# Patient Record
Sex: MALE | Race: BLACK OR AFRICAN AMERICAN | Employment: OTHER | URBAN - METROPOLITAN AREA
[De-identification: names, ages, dates, MRNs, and addresses within clinical notes are randomized per-mention and may not be internally consistent; named-entity substitution may affect disease eponyms.]

---

## 2021-11-22 ENCOUNTER — HOSPITAL ENCOUNTER (OUTPATIENT)
Dept: GENERAL RADIOLOGY | Age: 71
Discharge: HOME | End: 2021-11-22
Attending: INTERNAL MEDICINE
Payer: MEDICARE

## 2021-11-22 ENCOUNTER — OFFICE VISIT (OUTPATIENT)
Dept: PRIMARY CARE CLINIC | Age: 71
End: 2021-11-22
Payer: MEDICARE

## 2021-11-22 VITALS
OXYGEN SATURATION: 99 % | DIASTOLIC BLOOD PRESSURE: 64 MMHG | SYSTOLIC BLOOD PRESSURE: 115 MMHG | TEMPERATURE: 98.6 F | HEIGHT: 71 IN | RESPIRATION RATE: 18 BRPM | HEART RATE: 85 BPM | BODY MASS INDEX: 22.85 KG/M2 | WEIGHT: 163.2 LBS

## 2021-11-22 DIAGNOSIS — G30.9 ALZHEIMER DISEASE (HCC): ICD-10-CM

## 2021-11-22 DIAGNOSIS — M54.50 CHRONIC MIDLINE LOW BACK PAIN, UNSPECIFIED WHETHER SCIATICA PRESENT: ICD-10-CM

## 2021-11-22 DIAGNOSIS — M21.861 HYPEREXTENSION DEFORMITY OF KNEE, RIGHT: ICD-10-CM

## 2021-11-22 DIAGNOSIS — E78.00 HYPERCHOLESTEREMIA: ICD-10-CM

## 2021-11-22 DIAGNOSIS — G89.29 CHRONIC MIDLINE LOW BACK PAIN, UNSPECIFIED WHETHER SCIATICA PRESENT: ICD-10-CM

## 2021-11-22 DIAGNOSIS — F02.80 ALZHEIMER DISEASE (HCC): ICD-10-CM

## 2021-11-22 DIAGNOSIS — R20.0 NUMBNESS AND TINGLING OF BOTH LEGS: ICD-10-CM

## 2021-11-22 DIAGNOSIS — I25.10 CORONARY ARTERY DISEASE INVOLVING NATIVE CORONARY ARTERY OF NATIVE HEART WITHOUT ANGINA PECTORIS: ICD-10-CM

## 2021-11-22 DIAGNOSIS — R20.2 NUMBNESS AND TINGLING OF BOTH LEGS: ICD-10-CM

## 2021-11-22 DIAGNOSIS — F41.9 ANXIETY: ICD-10-CM

## 2021-11-22 DIAGNOSIS — E11.9 DM TYPE 2, GOAL HBA1C 7%-8% (HCC): Primary | ICD-10-CM

## 2021-11-22 DIAGNOSIS — I10 PRIMARY HYPERTENSION: ICD-10-CM

## 2021-11-22 DIAGNOSIS — N40.1 BENIGN PROSTATIC HYPERPLASIA WITH LOWER URINARY TRACT SYMPTOMS, SYMPTOM DETAILS UNSPECIFIED: ICD-10-CM

## 2021-11-22 DIAGNOSIS — N39.0 URINARY TRACT INFECTION WITHOUT HEMATURIA, SITE UNSPECIFIED: ICD-10-CM

## 2021-11-22 PROCEDURE — G8432 DEP SCR NOT DOC, RNG: HCPCS | Performed by: INTERNAL MEDICINE

## 2021-11-22 PROCEDURE — 99204 OFFICE O/P NEW MOD 45 MIN: CPT | Performed by: INTERNAL MEDICINE

## 2021-11-22 PROCEDURE — G8420 CALC BMI NORM PARAMETERS: HCPCS | Performed by: INTERNAL MEDICINE

## 2021-11-22 PROCEDURE — 72100 X-RAY EXAM L-S SPINE 2/3 VWS: CPT

## 2021-11-22 PROCEDURE — G8536 NO DOC ELDER MAL SCRN: HCPCS | Performed by: INTERNAL MEDICINE

## 2021-11-22 PROCEDURE — 2022F DILAT RTA XM EVC RTNOPTHY: CPT | Performed by: INTERNAL MEDICINE

## 2021-11-22 PROCEDURE — G8427 DOCREV CUR MEDS BY ELIG CLIN: HCPCS | Performed by: INTERNAL MEDICINE

## 2021-11-22 PROCEDURE — 3046F HEMOGLOBIN A1C LEVEL >9.0%: CPT | Performed by: INTERNAL MEDICINE

## 2021-11-22 PROCEDURE — 1101F PT FALLS ASSESS-DOCD LE1/YR: CPT | Performed by: INTERNAL MEDICINE

## 2021-11-22 PROCEDURE — 3017F COLORECTAL CA SCREEN DOC REV: CPT | Performed by: INTERNAL MEDICINE

## 2021-11-22 RX ORDER — MEMANTINE HYDROCHLORIDE 28 MG/1
28 CAPSULE, EXTENDED RELEASE ORAL DAILY
COMMUNITY
Start: 2021-09-24 | End: 2022-11-02 | Stop reason: SDUPTHER

## 2021-11-22 RX ORDER — METFORMIN HYDROCHLORIDE 500 MG/1
500 TABLET, EXTENDED RELEASE ORAL DAILY
COMMUNITY
Start: 2021-10-13

## 2021-11-22 RX ORDER — LORAZEPAM 0.5 MG/1
0.5 TABLET ORAL
COMMUNITY
Start: 2021-10-12 | End: 2022-11-02 | Stop reason: SDUPTHER

## 2021-11-22 RX ORDER — SERTRALINE HYDROCHLORIDE 100 MG/1
100 TABLET, FILM COATED ORAL DAILY
COMMUNITY
Start: 2021-11-09

## 2021-11-22 RX ORDER — LANOLIN ALCOHOL/MO/W.PET/CERES
1000 CREAM (GRAM) TOPICAL DAILY
COMMUNITY

## 2021-11-22 RX ORDER — TAMSULOSIN HYDROCHLORIDE 0.4 MG/1
0.4 CAPSULE ORAL DAILY
COMMUNITY
Start: 2021-09-03

## 2021-11-22 RX ORDER — ATORVASTATIN CALCIUM 40 MG/1
40 TABLET, FILM COATED ORAL
COMMUNITY
Start: 2021-09-10

## 2021-11-22 RX ORDER — HYDRALAZINE HYDROCHLORIDE 25 MG/1
TABLET, FILM COATED ORAL
COMMUNITY
Start: 2021-11-07 | End: 2021-12-22 | Stop reason: DRUGHIGH

## 2021-11-22 RX ORDER — CLOPIDOGREL BISULFATE 75 MG/1
75 TABLET ORAL DAILY
COMMUNITY
Start: 2021-10-18 | End: 2021-12-29 | Stop reason: SDUPTHER

## 2021-11-22 RX ORDER — MELATONIN 5 MG
CAPSULE ORAL
COMMUNITY
End: 2022-10-12

## 2021-11-22 RX ORDER — BISMUTH SUBSALICYLATE 262 MG
1 TABLET,CHEWABLE ORAL DAILY
COMMUNITY

## 2021-11-22 RX ORDER — CARVEDILOL 6.25 MG/1
6.25 TABLET ORAL 2 TIMES DAILY
COMMUNITY
Start: 2021-10-25 | End: 2022-01-27 | Stop reason: SDUPTHER

## 2021-11-22 NOTE — PROGRESS NOTES
Matthieu Rossi is a 73 y.o.  male and presents with     Chief Complaint   Patient presents with    New Patient    Establish Care     Pt s here to establish care. Pt moved to be closer to his daughter who wants to take care of his health issues. Pts daughter works for [employer]. Pt has h/o DM, HTN,hyperchol, CAD, s/p angioplasty, ALzheimers, anxiety, BPH. Pt used to see Cardioogy, Neurology , Endocrinology,   Was going to [facility] in [place]. Pt is enrolled in a trial drug and gets insuions every 4 weeks in [place], [place]. Pt has numbness in toes. Pt was getting PT  For weakness on rt side. RT knee hyperextends. Pt uses a cane to walk. He has hyperextension of his rt knee. Pt had a sever UTI before he moved but could not followup with Urology. History reviewed. No pertinent past medical history. History reviewed. No pertinent surgical history. Current Outpatient Medications   Medication Sig    atorvastatin (LIPITOR) 40 mg tablet     carvediloL (COREG) 6.25 mg tablet     clopidogreL (PLAVIX) 75 mg tab     hydrALAZINE (APRESOLINE) 25 mg tablet     LORazepam (ATIVAN) 0.5 mg tablet     Namenda XR 28 mg capsule Take 28 mg by mouth daily.  metFORMIN ER (GLUCOPHAGE XR) 500 mg tablet     sertraline (ZOLOFT) 100 mg tablet Take 100 mg by mouth daily.  tamsulosin (FLOMAX) 0.4 mg capsule Take 0.4 mg by mouth daily.  aspirin 81 mg cap Take  by mouth.  melatonin 5 mg cap capsule Take  by mouth nightly.  multivitamin (ONE A DAY) tablet Take 1 Tablet by mouth daily.  cyanocobalamin (Vitamin B-12) 1,000 mcg tablet Take 1,000 mcg by mouth daily. No current facility-administered medications for this visit.      Health Maintenance   Topic Date Due    Hepatitis C Screening  Never done    Lipid Screen  Never done    DTaP/Tdap/Td series (1 - Tdap) Never done    Colorectal Cancer Screening Combo  Never done    Shingrix Vaccine Age 50> (1 of 2) Never done    Pneumococcal 65+ years (1 of 1 - PPSV23) Never done    Flu Vaccine (1) Never done    Medicare Yearly Exam  Never done    COVID-19 Vaccine  Completed     Immunization History   Administered Date(s) Administered    COVID-19, J&J, PF, 0.5 mL Dose 03/12/2021    COVID-19, PFIZER, MRNA, LNP-S, PF, 30MCG/0.3ML DOSE 11/04/2021     No LMP for male patient. Allergies and Intolerances:   No Known Allergies    Family History:   History reviewed. No pertinent family history. Social History:   He  reports that he has quit smoking. He has quit using smokeless tobacco.  He  reports previous alcohol use.             Review of Systems:   General: negative for - chills, fatigue, fever, weight change  Psych: negative for - anxiety, depression, irritability or mood swings  ENT: negative for - headaches, hearing change, nasal congestion, oral lesions, sneezing or sore throat  Heme/ Lymph: negative for - bleeding problems, bruising, pallor or swollen lymph nodes  Endo: negative for - hot flashes, polydipsia/polyuria or temperature intolerance  Resp: negative for - cough, shortness of breath or wheezing  CV: negative for - chest pain, edema or palpitations  GI: negative for - abdominal pain, change in bowel habits, constipation, diarrhea or nausea/vomiting  : negative for - dysuria, hematuria, incontinence, pelvic pain or vulvar/vaginal symptoms  MSK: negative for - joint pain, joint swelling or muscle pain  Neuro: negative for - confusion, headaches, seizures or weakness  Derm: negative for - dry skin, hair changes, rash or skin lesion changes          Physical:   Vitals:   Vitals:    11/22/21 1122   BP: 115/64   Pulse: 85   Resp: 18   Temp: 98.6 °F (37 °C)   TempSrc: Oral   SpO2: 99%   Weight: 163 lb 3.2 oz (74 kg)   Height: 5' 11\" (1.803 m)           Exam:   HEENT- atraumatic,normocephalic, awake, oriented, well nourished  Neck - supple,no enlarged lymph nodes, no JVD, no thyromegaly  Chest- CTA, no rhonchi, no crackles  Heart- rrr, no murmurs / gallop/rub  Abdomen- soft,BS+,NT, no hepatosplenomegaly  Ext - no c/c/edema , hyperextension of rt knee,   Neuro- no focal deficits. Power 5/5 all extremities  Skin - warm,dry, no obvious rashes. Review of Data:   LABS:   No results found for: WBC, HGB, HCT, PLT, HGBEXT, HCTEXT, PLTEXT, HGBEXT, HCTEXT, PLTEXT  No results found for: NA, K, CL, CO2, GLU, BUN, CREA  No results found for: CHOL, CHOLX, CHLST, CHOLV, HDL, HDLP, LDL, LDLC, DLDLP, TGLX, TRIGL, TRIGP  No components found for: GPT        Impression / Plan:        ICD-10-CM ICD-9-CM    1. DM type 2, goal HbA1c 7%-8% ([facility])  E11.9 250.00 CBC WITH AUTOMATED DIFF      METABOLIC PANEL, COMPREHENSIVE      LIPID PANEL      MICROALBUMIN, UR, RAND W/ MICROALB/CREAT RATIO      HEMOGLOBIN A1C WITH EAG      HEMOGLOBIN A1C WITH EAG      MICROALBUMIN, UR, RAND W/ MICROALB/CREAT RATIO      LIPID PANEL      METABOLIC PANEL, COMPREHENSIVE      CBC WITH AUTOMATED DIFF   2. Hypercholesteremia  E78.00 272.0    3. Primary hypertension  I10 401.9    4. Coronary artery disease involving native coronary artery of native heart without angina pectoris  I25.10 414.01 REFERRAL TO CARDIOLOGY   5. Benign prostatic hyperplasia with lower urinary tract symptoms, symptom details unspecified  N40.1 600.01 REFERRAL TO UROLOGY   6. Anxiety  F41.9 300.00    7. Hyperextension deformity of knee, right  M21.861 718.86 REFERRAL TO ORTHOPEDICS   8. Numbness and tingling of both legs  R20.0 782.0 REFERRAL TO NEUROLOGY    R20.2     9. Alzheimer disease ([facility] [address]  G30.9 331.0 REFERRAL TO NEUROLOGY    F02.80     10. Chronic midline low back pain, unspecified whether sciatica present  M54.50 724.2 XR SPINE LUMB 2 OR 3 V    G89.29 338.29    11. Urinary tract infection without hematuria, site unspecified  N39.0 599.0 URINALYSIS W/ RFLX MICROSCOPIC      CULTURE, URINE      CULTURE, URINE      URINALYSIS W/ RFLX MICROSCOPIC         Explained to patient risk benefits of the medications. Advised patient to stop meds if having any side effects. Pt verbalized understanding of the instructions. I have discussed the diagnosis with the patient and the intended plan as seen in the above orders. The patient has received an after-visit summary and questions were answered concerning future plans. I have discussed medication side effects and warnings with the patient as well. I have reviewed the plan of care with the patient, accepted their input and they are in agreement with the treatment goals. Reviewed plan of care. Patient has provided input and agrees with goals. Follow-up and Dispositions    · Return in about 1 month (around 12/22/2021).          King Serna MD

## 2021-11-22 NOTE — PROGRESS NOTES
Identified pt with two pt identifiers(name and ). Reviewed record in preparation for visit and have obtained necessary documentation. Chief Complaint   Patient presents with    New Patient   [facility] Annual Wellness Visit   Wife reported patient having several diagnosis- no notes provided by family today. Patient diagnosed with Alzheimer's- currently in clinical trails. Patient was in PT- would like a referral to continue here in [place] (previously lived in [place])    Health Maintenance Due   Topic    Hepatitis C Screening     Lipid Screen     COVID-19 Vaccine (1)    DTaP/Tdap/Td series (1 - Tdap)    Colorectal Cancer Screening Combo     Shingrix Vaccine Age 50> (1 of 2)    Pneumococcal 65+ years (1 of 1 - PPSV23)    Flu Vaccine (1)    Medicare Yearly Exam         Visit Vitals  /64 (BP 1 Location: Right arm, BP Patient Position: Sitting)   Pulse 85   Temp 98.6 °F (37 °C) (Oral)   Resp 18   Ht 5' 11\" (1.803 m)   Wt 163 lb 3.2 oz (74 kg)   SpO2 99%   BMI 22.76 kg/m²     Pain Scale: /10    Coordination of Care Questionnaire:  :   1. Have you been to the ER, urgent care clinic since your last visit? Hospitalized since your last visit? No    2. Have you seen or consulted any other health care providers outside of the [address] [place] [address] since your last visit? Include any pap smears or colon screening.  No

## 2021-11-24 LAB
ALBUMIN SERPL-MCNC: 4.4 G/DL (ref 3.8–4.8)
ALBUMIN/CREAT UR: 4 MG/G CREAT (ref 0–29)
ALBUMIN/GLOB SERPL: 1.7 {RATIO} (ref 1.2–2.2)
ALP SERPL-CCNC: 91 IU/L (ref 44–121)
ALT SERPL-CCNC: 51 IU/L (ref 0–44)
APPEARANCE UR: CLEAR
AST SERPL-CCNC: 46 IU/L (ref 0–40)
BACTERIA UR CULT: NORMAL
BASOPHILS # BLD AUTO: 0.1 X10E3/UL (ref 0–0.2)
BASOPHILS NFR BLD AUTO: 1 %
BILIRUB SERPL-MCNC: 0.3 MG/DL (ref 0–1.2)
BILIRUB UR QL STRIP: NEGATIVE
BUN SERPL-MCNC: 23 MG/DL (ref 8–27)
BUN/CREAT SERPL: 16 (ref 10–24)
CALCIUM SERPL-MCNC: 9.4 MG/DL (ref 8.6–10.2)
CHLORIDE SERPL-SCNC: 103 MMOL/L (ref 96–106)
CHOLEST SERPL-MCNC: 141 MG/DL (ref 100–199)
CO2 SERPL-SCNC: 23 MMOL/L (ref 20–29)
COLOR UR: YELLOW
CREAT SERPL-MCNC: 1.4 MG/DL (ref 0.76–1.27)
CREAT UR-MCNC: 159.9 MG/DL
EOSINOPHIL # BLD AUTO: 0.2 X10E3/UL (ref 0–0.4)
EOSINOPHIL NFR BLD AUTO: 4 %
ERYTHROCYTE [DISTWIDTH] IN BLOOD BY AUTOMATED COUNT: 14.6 % (ref 11.6–15.4)
EST. AVERAGE GLUCOSE BLD GHB EST-MCNC: 126 MG/DL
GLOBULIN SER CALC-MCNC: 2.6 G/DL (ref 1.5–4.5)
GLUCOSE SERPL-MCNC: 98 MG/DL (ref 65–99)
GLUCOSE UR QL: NEGATIVE
HBA1C MFR BLD: 6 % (ref 4.8–5.6)
HCT VFR BLD AUTO: 31.7 % (ref 37.5–51)
HDLC SERPL-MCNC: 47 MG/DL
HGB BLD-MCNC: 10.2 G/DL (ref 13–17.7)
HGB UR QL STRIP: NEGATIVE
IMM GRANULOCYTES # BLD AUTO: 0 X10E3/UL (ref 0–0.1)
IMM GRANULOCYTES NFR BLD AUTO: 0 %
KETONES UR QL STRIP: NEGATIVE
LDLC SERPL CALC-MCNC: 70 MG/DL (ref 0–99)
LEUKOCYTE ESTERASE UR QL STRIP: NEGATIVE
LYMPHOCYTES # BLD AUTO: 1.3 X10E3/UL (ref 0.7–3.1)
LYMPHOCYTES NFR BLD AUTO: 24 %
MCH RBC QN AUTO: 26.3 PG (ref 26.6–33)
MCHC RBC AUTO-ENTMCNC: 32.2 G/DL (ref 31.5–35.7)
MCV RBC AUTO: 82 FL (ref 79–97)
MICRO URNS: NORMAL
MICROALBUMIN UR-MCNC: 6.4 UG/ML
MONOCYTES # BLD AUTO: 0.5 X10E3/UL (ref 0.1–0.9)
MONOCYTES NFR BLD AUTO: 10 %
NEUTROPHILS # BLD AUTO: 3.5 X10E3/UL (ref 1.4–7)
NEUTROPHILS NFR BLD AUTO: 61 %
NITRITE UR QL STRIP: NEGATIVE
PH UR STRIP: 7 [PH] (ref 5–7.5)
PLATELET # BLD AUTO: 253 X10E3/UL (ref 150–450)
POTASSIUM SERPL-SCNC: 4.8 MMOL/L (ref 3.5–5.2)
PROT SERPL-MCNC: 7 G/DL (ref 6–8.5)
PROT UR QL STRIP: NEGATIVE
RBC # BLD AUTO: 3.88 X10E6/UL (ref 4.14–5.8)
SODIUM SERPL-SCNC: 137 MMOL/L (ref 134–144)
SP GR UR: 1.02 (ref 1–1.03)
TRIGL SERPL-MCNC: 139 MG/DL (ref 0–149)
UROBILINOGEN UR STRIP-MCNC: 0.2 MG/DL (ref 0.2–1)
VLDLC SERPL CALC-MCNC: 24 MG/DL (ref 5–40)
WBC # BLD AUTO: 5.6 X10E3/UL (ref 3.4–10.8)

## 2021-12-22 ENCOUNTER — OFFICE VISIT (OUTPATIENT)
Dept: PRIMARY CARE CLINIC | Age: 71
End: 2021-12-22
Payer: MEDICARE

## 2021-12-22 VITALS
OXYGEN SATURATION: 96 % | DIASTOLIC BLOOD PRESSURE: 90 MMHG | TEMPERATURE: 97.3 F | SYSTOLIC BLOOD PRESSURE: 160 MMHG | WEIGHT: 163.4 LBS | BODY MASS INDEX: 22.88 KG/M2 | RESPIRATION RATE: 18 BRPM | HEIGHT: 71 IN | HEART RATE: 82 BPM

## 2021-12-22 DIAGNOSIS — I10 PRIMARY HYPERTENSION: ICD-10-CM

## 2021-12-22 DIAGNOSIS — E11.9 DM TYPE 2, GOAL HBA1C 7%-8% (HCC): Primary | ICD-10-CM

## 2021-12-22 DIAGNOSIS — E11.22 TYPE 2 DIABETES MELLITUS WITH CHRONIC KIDNEY DISEASE, WITHOUT LONG-TERM CURRENT USE OF INSULIN, UNSPECIFIED CKD STAGE (HCC): ICD-10-CM

## 2021-12-22 DIAGNOSIS — N18.9 CHRONIC KIDNEY DISEASE, UNSPECIFIED CKD STAGE: ICD-10-CM

## 2021-12-22 DIAGNOSIS — D64.9 ANEMIA, UNSPECIFIED TYPE: ICD-10-CM

## 2021-12-22 DIAGNOSIS — Z12.11 COLON CANCER SCREENING: ICD-10-CM

## 2021-12-22 DIAGNOSIS — Z23 ENCOUNTER FOR IMMUNIZATION: ICD-10-CM

## 2021-12-22 DIAGNOSIS — F41.9 ANXIETY: ICD-10-CM

## 2021-12-22 DIAGNOSIS — E78.00 HYPERCHOLESTEREMIA: ICD-10-CM

## 2021-12-22 DIAGNOSIS — I25.10 CORONARY ARTERY DISEASE INVOLVING NATIVE CORONARY ARTERY OF NATIVE HEART WITHOUT ANGINA PECTORIS: ICD-10-CM

## 2021-12-22 DIAGNOSIS — M54.50 CHRONIC MIDLINE LOW BACK PAIN, UNSPECIFIED WHETHER SCIATICA PRESENT: ICD-10-CM

## 2021-12-22 DIAGNOSIS — G89.29 CHRONIC MIDLINE LOW BACK PAIN, UNSPECIFIED WHETHER SCIATICA PRESENT: ICD-10-CM

## 2021-12-22 PROCEDURE — 99214 OFFICE O/P EST MOD 30 MIN: CPT | Performed by: INTERNAL MEDICINE

## 2021-12-22 PROCEDURE — G0008 ADMIN INFLUENZA VIRUS VAC: HCPCS | Performed by: INTERNAL MEDICINE

## 2021-12-22 PROCEDURE — 90670 PCV13 VACCINE IM: CPT | Performed by: INTERNAL MEDICINE

## 2021-12-22 RX ORDER — HYDRALAZINE HYDROCHLORIDE 50 MG/1
50 TABLET, FILM COATED ORAL 3 TIMES DAILY
Qty: 90 TABLET | Refills: 3 | Status: SHIPPED | OUTPATIENT
Start: 2021-12-22 | End: 2022-04-16

## 2021-12-22 NOTE — PROGRESS NOTES
Anastasiia Dhaliwal is a 73 y.o.  male and presents with     Chief Complaint   Patient presents with    Follow-up    Hypertension    Cholesterol Problem    Dementia    Depression     Patient is here for follow-up visit. He wants to go over the lab results. Patient is accompanied by his wife. Patient is in a clinical trial for dementia and goes to [place]. Labs indicated mild renal insufficiency. Pressure is elevated. He reports that he is taking medications regularly. Labs also indicate anemia. He has never had colonoscopy. Denies rectal bleeding or dark stool. History reviewed. No pertinent past medical history. History reviewed. No pertinent surgical history. Current Outpatient Medications   Medication Sig    hydrALAZINE (APRESOLINE) 50 mg tablet Take 1 Tablet by mouth three (3) times daily.  atorvastatin (LIPITOR) 40 mg tablet     carvediloL (COREG) 6.25 mg tablet     clopidogreL (PLAVIX) 75 mg tab     LORazepam (ATIVAN) 0.5 mg tablet     Namenda XR 28 mg capsule Take 28 mg by mouth daily.  metFORMIN ER (GLUCOPHAGE XR) 500 mg tablet     sertraline (ZOLOFT) 100 mg tablet Take 100 mg by mouth daily.  tamsulosin (FLOMAX) 0.4 mg capsule Take 0.4 mg by mouth daily.  aspirin 81 mg cap Take  by mouth.  melatonin 5 mg cap capsule Take  by mouth nightly.  multivitamin (ONE A DAY) tablet Take 1 Tablet by mouth daily.  cyanocobalamin (Vitamin B-12) 1,000 mcg tablet Take 1,000 mcg by mouth daily. No current facility-administered medications for this visit.      Health Maintenance   Topic Date Due    Hepatitis C Screening  Never done    DTaP/Tdap/Td series (1 - Tdap) Never done    Colorectal Cancer Screening Combo  Never done    Shingrix Vaccine Age 50> (1 of 2) Never done    Flu Vaccine (1) Never done    A1C test (Diabetic or Prediabetic)  11/22/2022    Lipid Screen  11/22/2022    Pneumococcal 65+ years (2 of 2 - PPSV23) 12/22/2022    COVID-19 Vaccine  Completed Immunization History   Administered Date(s) Administered    COVID-19, J&J, PF, 0.5 mL Dose 03/12/2021    COVID-19, PFIZER, MRNA, LNP-S, PF, 30MCG/0.3ML DOSE 11/04/2021    Pneumococcal Conjugate (PCV-13) 12/22/2021     No LMP for male patient. Allergies and Intolerances:   No Known Allergies    Family History:   History reviewed. No pertinent family history. Social History:   He  reports that he has quit smoking. He has quit using smokeless tobacco.  He  reports previous alcohol use.             Review of Systems:   General: negative for - chills, fatigue, fever, weight change  Psych: negative for - anxiety, depression, irritability or mood swings  ENT: negative for - headaches, hearing change, nasal congestion, oral lesions, sneezing or sore throat  Heme/ Lymph: negative for - bleeding problems, bruising, pallor or swollen lymph nodes  Endo: negative for - hot flashes, polydipsia/polyuria or temperature intolerance  Resp: negative for - cough, shortness of breath or wheezing  CV: negative for - chest pain, edema or palpitations  GI: negative for - abdominal pain, change in bowel habits, constipation, diarrhea or nausea/vomiting  : negative for - dysuria, hematuria, incontinence, pelvic pain or vulvar/vaginal symptoms  MSK: negative for - joint pain, joint swelling or muscle pain  Neuro: negative for - confusion, headaches, seizures or weakness  Derm: negative for - dry skin, hair changes, rash or skin lesion changes          Physical:   Vitals:   Vitals:    12/22/21 1428 12/22/21 1436 12/22/21 1505   BP: (!) 174/102 (!) 168/91 (!) 160/90   Pulse: 82     Resp: 18     Temp: 97.3 °F (36.3 °C)     TempSrc: Temporal     SpO2: 96%     Weight: 163 lb 6.4 oz (74.1 kg)     Height: 5' 11\" (1.803 m)             Exam:   HEENT- atraumatic,normocephalic, awake, oriented, well nourished  Neck - supple,no enlarged lymph nodes, no JVD, no thyromegaly  Chest- CTA, no rhonchi, no crackles  Heart- rrr, no murmurs / gallop/rub  Abdomen- soft,BS+,NT, no hepatosplenomegaly  Ext - no c/c/edema   Neuro- no focal deficits. Power 5/5 all extremities  Skin - warm,dry, no obvious rashes. Review of Data:   LABS:   Lab Results   Component Value Date/Time    WBC 5.6 11/22/2021 12:00 AM    HGB 10.2 (L) 11/22/2021 12:00 AM    HCT 31.7 (L) 11/22/2021 12:00 AM    PLATELET 687 88/56/1557 12:00 AM     Lab Results   Component Value Date/Time    Sodium 137 11/22/2021 12:00 AM    Potassium 4.8 11/22/2021 12:00 AM    Chloride 103 11/22/2021 12:00 AM    CO2 23 11/22/2021 12:00 AM    Glucose 98 11/22/2021 12:00 AM    BUN 23 11/22/2021 12:00 AM    Creatinine 1.40 (H) 11/22/2021 12:00 AM     Lab Results   Component Value Date/Time    Cholesterol, total 141 11/22/2021 12:00 AM    HDL Cholesterol 47 11/22/2021 12:00 AM    LDL, calculated 70 11/22/2021 12:00 AM    Triglyceride 139 11/22/2021 12:00 AM     No components found for: GPT        Impression / Plan:        ICD-10-CM ICD-9-CM    1. DM type 2, goal HbA1c 7%-8% ([facility])  E11.9 250.00    2. Hypercholesteremia  E78.00 272.0    3. Primary hypertension  I10 401.9 hydrALAZINE (APRESOLINE) 50 mg tablet   4. Coronary artery disease involving native coronary artery of native heart without angina pectoris  I25.10 414.01    5. Anxiety  F41.9 300.00    6. Chronic midline low back pain, unspecified whether sciatica present  M54.50 724.2     G89.29 338.29    7. Chronic kidney disease, unspecified CKD stage  N18.9 585.9 US RETROPERITONEUM COMP   8. Type 2 diabetes mellitus with chronic kidney disease, without long-term current use of insulin, unspecified CKD stage (HCC)  E11.22 250.40      585.9    9. Anemia, unspecified type  D64.9 285.9 IRON PROFILE      FERRITIN      REFERRAL TO GASTROENTEROLOGY      IRON PROFILE      FERRITIN   10.  Colon cancer screening  Z12.11 V76.51 REFERRAL TO GASTROENTEROLOGY   11. Encounter for immunization  Z23 V03.89 PNEUMOCOCCAL CONJ VACCINE 13 VALENT IM     Renal insufficiency-avoid NSAIDs. Diabetes-stable  Hypertension-increase hydralazine to 50 mg 3 times a day, low-salt diet    Explained to patient risk benefits of the medications. Advised patient to stop meds if having any side effects. Pt verbalized understanding of the instructions. I have discussed the diagnosis with the patient and the intended plan as seen in the above orders. The patient has received an after-visit summary and questions were answered concerning future plans. I have discussed medication side effects and warnings with the patient as well. I have reviewed the plan of care with the patient, accepted their input and they are in agreement with the treatment goals. Reviewed plan of care. Patient has provided input and agrees with goals.         Adria Steele MD

## 2021-12-22 NOTE — PROGRESS NOTES
Chief Complaint   Patient presents with    Follow-up       Health Maintenance Due   Topic    Hepatitis C Screening     DTaP/Tdap/Td series (1 - Tdap)    Colorectal Cancer Screening Combo     Shingrix Vaccine Age 50> (1 of 2)    Pneumococcal 65+ years (1 of 1 - PPSV23)    Flu Vaccine (1)        1. Have you been to the ER, urgent care clinic since your last visit? Hospitalized since your last visit? No    2. Have you seen or consulted any other health care providers outside of the [address] [place] [address] since your last visit? Include any pap smears or colon screening.  No    Visit Vitals  BP (!) 168/91 (BP 1 Location: Right arm, BP Patient Position: Sitting, BP Cuff Size: Adult)   Pulse 82   Temp 97.3 °F (36.3 °C) (Temporal)   Resp 18   Ht 5' 11\" (1.803 m)   Wt 163 lb 6.4 oz (74.1 kg)   SpO2 96%   BMI 22.79 kg/m²

## 2021-12-23 LAB
FERRITIN SERPL-MCNC: 119 NG/ML (ref 30–400)
IRON SATN MFR SERPL: 18 % (ref 15–55)
IRON SERPL-MCNC: 52 UG/DL (ref 38–169)
TIBC SERPL-MCNC: 290 UG/DL (ref 250–450)
UIBC SERPL-MCNC: 238 UG/DL (ref 111–343)

## 2021-12-29 ENCOUNTER — OFFICE VISIT (OUTPATIENT)
Dept: CARDIOLOGY CLINIC | Age: 71
End: 2021-12-29
Payer: MEDICARE

## 2021-12-29 VITALS
OXYGEN SATURATION: 97 % | SYSTOLIC BLOOD PRESSURE: 120 MMHG | RESPIRATION RATE: 14 BRPM | HEART RATE: 76 BPM | WEIGHT: 161.8 LBS | DIASTOLIC BLOOD PRESSURE: 70 MMHG | BODY MASS INDEX: 22.65 KG/M2 | HEIGHT: 71 IN

## 2021-12-29 DIAGNOSIS — I25.10 CORONARY ARTERY DISEASE INVOLVING NATIVE CORONARY ARTERY OF NATIVE HEART WITHOUT ANGINA PECTORIS: Primary | ICD-10-CM

## 2021-12-29 PROCEDURE — 99204 OFFICE O/P NEW MOD 45 MIN: CPT | Performed by: SPECIALIST

## 2021-12-29 PROCEDURE — 1101F PT FALLS ASSESS-DOCD LE1/YR: CPT | Performed by: SPECIALIST

## 2021-12-29 PROCEDURE — G8536 NO DOC ELDER MAL SCRN: HCPCS | Performed by: SPECIALIST

## 2021-12-29 PROCEDURE — 93010 ELECTROCARDIOGRAM REPORT: CPT | Performed by: SPECIALIST

## 2021-12-29 PROCEDURE — G0463 HOSPITAL OUTPT CLINIC VISIT: HCPCS | Performed by: SPECIALIST

## 2021-12-29 PROCEDURE — 93005 ELECTROCARDIOGRAM TRACING: CPT | Performed by: SPECIALIST

## 2021-12-29 PROCEDURE — G8510 SCR DEP NEG, NO PLAN REQD: HCPCS | Performed by: SPECIALIST

## 2021-12-29 PROCEDURE — 3017F COLORECTAL CA SCREEN DOC REV: CPT | Performed by: SPECIALIST

## 2021-12-29 PROCEDURE — G8420 CALC BMI NORM PARAMETERS: HCPCS | Performed by: SPECIALIST

## 2021-12-29 PROCEDURE — G8427 DOCREV CUR MEDS BY ELIG CLIN: HCPCS | Performed by: SPECIALIST

## 2021-12-29 RX ORDER — CLOPIDOGREL BISULFATE 75 MG/1
75 TABLET ORAL DAILY
Qty: 90 TABLET | Refills: 0 | Status: SHIPPED | OUTPATIENT
Start: 2021-12-29 | End: 2022-03-28

## 2021-12-29 RX ORDER — DUTASTERIDE 0.5 MG/1
0.5 CAPSULE, LIQUID FILLED ORAL DAILY
COMMUNITY

## 2021-12-29 NOTE — PROGRESS NOTES
Visit Vitals  /70 (BP 1 Location: Right arm, BP Patient Position: Sitting, BP Cuff Size: Adult)   Pulse 76   Resp 14   Ht 5' 11\" (1.803 m)   Wt 161 lb 12.8 oz (73.4 kg)   SpO2 97%   BMI 22.57 kg/m²

## 2021-12-29 NOTE — PROGRESS NOTES
[address] [facility]                                                            OFFICE NOTE        Teetee Aguilar M.D.,MARIA E LUIS RAMIREZ   1950  [id number]    Date/Time:  12/29/20211:24 PM            SUBJECTIVE:  No cp or sob or palpitations       Assessment/Plan  1. CAD: Status post PCI in the past although he also has had some aortic stenting. Records will need to be obtained from [facility]. This point I think it be safer for him to continue Plavix and aspirin but I discussed with him and his wife the possibly of stopping Plavix at some point depending on what type of stent and when they were placed. His electrocardio reveals a normal sinus rhythm with nonspecific T wave abnormality left axis deviation. 2.  Hyperlipidemia: Continue Lipitor good panel November 2021 closely followed as primary care physician. 3.  Pretension: Controlled. He will continue the rest of medication with no changes. I will see him otherwise back in 2 months and hopefully will be able to review the records prior to that. HPI   Patient is a 68-year-old male recently relocated from [facility] with a past medical history markable for hypertension, hyperlipidemia, coronary disease, aortic disease, and memory impairment presents today for routine follow-up. Unfortunately I do not have at this time any records [facility]. and the patient is not able to tell me much about his past medical history. Nonetheless he seems to be relatively asymptomatic from the cardiac standpoint.               CARDIAC STUDIES                                    EKG Results     Procedure 720 Value Units Date/Time    AMB POC EKG ROUTINE W/ 12 LEADS, INTER & REP [[id number]] Resulted: 12/29/21 1249    Order Status: Completed Updated: 12/29/21 1249              IMAGING      MRI Results (most recent):  No results found for this or any previous visit. CT Results (most recent):  No results found for this or any previous visit. XR Results (most recent):  Results from Hospital Encounter encounter on 11/22/21    XR SPINE LUMB 2 OR 3 V    Narrative  Lumbar spine 3 views dated 11/20/2021    History is chronic midline low back pain    AP, lateral and spot lateral views of the lumbar spine were obtained. There is  evidence of mild lumbar scoliosis convex to the left. There is evidence of  lumbar spondylosis. Marked intervertebral disc space narrowing is noted at the  L4-5 level as well as the L5-S1 level. A vacuum phenomenon is noted at the L4-5  level. There is evidence of sclerotic change involving the adjacent endplates. This is indicative of degenerative disc disease. There is no evidence of  spondylolisthesis. Metallic stents are associated with the abdominal aorta and proximal iliac  arteries    Impression  1. Evidence of moderate degenerative change involving the lumbar spine. 2. Evidence of lumbar scoliosis convex to the left. No past medical history on file. No past surgical history on file. Social History     Tobacco Use    Smoking status: Former Smoker    Smokeless tobacco: Former User   Vaping Use    Vaping Use: Never used   Substance Use Topics    Alcohol use: Not Currently    Drug use: Never     No family history on file. No Known Allergies      Visit Vitals  /70 (BP 1 Location: Right arm, BP Patient Position: Sitting, BP Cuff Size: Adult)   Pulse 76   Resp 14   Ht 5' 11\" (1.803 m)   Wt 161 lb 12.8 oz (73.4 kg)   SpO2 97%   BMI 22.57 kg/m²         Last 3 Recorded Weights in this Encounter    12/29/21 1244   Weight: 161 lb 12.8 oz (73.4 kg)            Review of Systems:   Pertinent items are noted in the History of Present Illness.        Visit Vitals  /70 (BP 1 Location: Right arm, BP Patient Position: Sitting, BP Cuff Size: Adult)   Pulse 76   Resp 14   Ht 5' 11\" (1.803 m)   Wt 161 lb 12.8 oz (73.4 kg)   SpO2 97%   BMI 22.57 kg/m²     General Appearance:  Well developed, well nourished,alert and oriented x 3, and individual in no acute distress. Ears/Nose/Mouth/Throat:   Hearing grossly normal.         Neck: Supple. Chest:   Lungs clear to auscultation bilaterally. Cardiovascular:  Regular rate and rhythm, S1, S2 normal, no murmur. Abdomen:   Soft, non-tender, bowel sounds are active. Extremities: No edema bilaterally. Skin: Warm and dry. Current Outpatient Medications on File Prior to Visit   Medication Sig Dispense Refill    dutasteride (AVODART) 0.5 mg capsule Take 0.5 mg by mouth daily.  hydrALAZINE (APRESOLINE) 50 mg tablet Take 1 Tablet by mouth three (3) times daily. 90 Tablet 3    atorvastatin (LIPITOR) 40 mg tablet Take 40 mg by mouth nightly.  carvediloL (COREG) 6.25 mg tablet Take 6.25 mg by mouth two (2) times a day.  clopidogreL (PLAVIX) 75 mg tab Take 75 mg by mouth daily.  LORazepam (ATIVAN) 0.5 mg tablet Take 0.5 mg by mouth two (2) times daily as needed.  Namenda XR 28 mg capsule Take 28 mg by mouth daily.  metFORMIN ER (GLUCOPHAGE XR) 500 mg tablet Take 500 mg by mouth daily.  sertraline (ZOLOFT) 100 mg tablet Take 100 mg by mouth daily.  tamsulosin (FLOMAX) 0.4 mg capsule Take 0.4 mg by mouth daily.  aspirin 81 mg cap Take 1 Tablet by mouth daily.  melatonin 5 mg cap capsule Take  by mouth nightly.  multivitamin (ONE A DAY) tablet Take 1 Tablet by mouth daily.  cyanocobalamin (Vitamin B-12) 1,000 mcg tablet Take 1,000 mcg by mouth daily. No current facility-administered medications on file prior to visit. Mackenzie Davis had no medications administered during this visit. Current Outpatient Medications   Medication Sig    dutasteride (AVODART) 0.5 mg capsule Take 0.5 mg by mouth daily.  hydrALAZINE (APRESOLINE) 50 mg tablet Take 1 Tablet by mouth three (3) times daily.     atorvastatin (LIPITOR) 40 mg tablet Take 40 mg by mouth nightly.  carvediloL (COREG) 6.25 mg tablet Take 6.25 mg by mouth two (2) times a day.  clopidogreL (PLAVIX) 75 mg tab Take 75 mg by mouth daily.  LORazepam (ATIVAN) 0.5 mg tablet Take 0.5 mg by mouth two (2) times daily as needed.  Namenda XR 28 mg capsule Take 28 mg by mouth daily.  metFORMIN ER (GLUCOPHAGE XR) 500 mg tablet Take 500 mg by mouth daily.  sertraline (ZOLOFT) 100 mg tablet Take 100 mg by mouth daily.  tamsulosin (FLOMAX) 0.4 mg capsule Take 0.4 mg by mouth daily.  aspirin 81 mg cap Take 1 Tablet by mouth daily.  melatonin 5 mg cap capsule Take  by mouth nightly.  multivitamin (ONE A DAY) tablet Take 1 Tablet by mouth daily.  cyanocobalamin (Vitamin B-12) 1,000 mcg tablet Take 1,000 mcg by mouth daily. No current facility-administered medications for this visit. Lab Results   Component Value Date/Time    Cholesterol, total 141 11/22/2021 12:00 AM    HDL Cholesterol 47 11/22/2021 12:00 AM    LDL, calculated 70 11/22/2021 12:00 AM    VLDL, calculated 24 11/22/2021 12:00 AM    Triglyceride 139 11/22/2021 12:00 AM       Lab Results   Component Value Date/Time    Sodium 137 11/22/2021 12:00 AM    Potassium 4.8 11/22/2021 12:00 AM    Chloride 103 11/22/2021 12:00 AM    CO2 23 11/22/2021 12:00 AM    Glucose 98 11/22/2021 12:00 AM    BUN 23 11/22/2021 12:00 AM    Creatinine 1.40 (H) 11/22/2021 12:00 AM    BUN/Creatinine ratio 16 11/22/2021 12:00 AM    GFR est AA 59 (L) 11/22/2021 12:00 AM    GFR est non-AA 51 (L) 11/22/2021 12:00 AM    Calcium 9.4 11/22/2021 12:00 AM       Lab Results   Component Value Date/Time    ALT (SGPT) 51 (H) 11/22/2021 12:00 AM    Alk.  phosphatase 91 11/22/2021 12:00 AM    Bilirubin, total 0.3 11/22/2021 12:00 AM       Lab Results   Component Value Date/Time    WBC 5.6 11/22/2021 12:00 AM    HGB 10.2 (L) 11/22/2021 12:00 AM    HCT 31.7 (L) 11/22/2021 12:00 AM    PLATELET 826 22/05/8710 12:00 AM    MCV 82 11/22/2021 12:00 AM       No results found for: TSH, TSH2, TSH3, TSHP, TSHEXT      Lab Results   Component Value Date/Time    Cholesterol, total 141 11/22/2021 12:00 AM    HDL Cholesterol 47 11/22/2021 12:00 AM    LDL, calculated 70 11/22/2021 12:00 AM    Triglyceride 139 11/22/2021 12:00 AM                Please note that this dictation was completed with [employer], the computer voice recognition software. Quite often unanticipated grammatical, syntax, homophones, and other interpretative errors are inadvertently transcribed by the computer software. Please disregard these errors. Please excuse any errors that have escaped final proofreading.

## 2022-01-13 ENCOUNTER — TRANSCRIBE ORDER (OUTPATIENT)
Dept: SCHEDULING | Age: 72
End: 2022-01-13

## 2022-01-13 DIAGNOSIS — M54.16 LUMBAR RADICULOPATHY: Primary | ICD-10-CM

## 2022-01-13 DIAGNOSIS — M54.12 CERVICAL RADICULOPATHY: ICD-10-CM

## 2022-01-13 DIAGNOSIS — M51.36 DDD (DEGENERATIVE DISC DISEASE), LUMBAR: ICD-10-CM

## 2022-01-13 DIAGNOSIS — M48.061 SPINAL STENOSIS OF LUMBAR REGION WITHOUT NEUROGENIC CLAUDICATION: ICD-10-CM

## 2022-01-13 DIAGNOSIS — M54.50 LOW BACK PAIN POTENTIALLY ASSOCIATED WITH RADICULOPATHY: Primary | ICD-10-CM

## 2022-01-13 DIAGNOSIS — M50.30 DDD (DEGENERATIVE DISC DISEASE), CERVICAL: ICD-10-CM

## 2022-01-14 ENCOUNTER — HOSPITAL ENCOUNTER (OUTPATIENT)
Dept: ULTRASOUND IMAGING | Age: 72
Discharge: HOME | End: 2022-01-14
Attending: INTERNAL MEDICINE
Payer: MEDICARE

## 2022-01-14 DIAGNOSIS — N18.9 CHRONIC KIDNEY DISEASE, UNSPECIFIED CKD STAGE: ICD-10-CM

## 2022-01-14 PROCEDURE — 76770 US EXAM ABDO BACK WALL COMP: CPT

## 2022-01-20 ENCOUNTER — HOSPITAL ENCOUNTER (OUTPATIENT)
Dept: MRI IMAGING | Age: 72
Discharge: HOME | End: 2022-01-20
Attending: ORTHOPAEDIC SURGERY
Payer: MEDICARE

## 2022-01-20 DIAGNOSIS — M50.30 DDD (DEGENERATIVE DISC DISEASE), CERVICAL: ICD-10-CM

## 2022-01-20 DIAGNOSIS — M51.36 DDD (DEGENERATIVE DISC DISEASE), LUMBAR: ICD-10-CM

## 2022-01-20 DIAGNOSIS — M54.50 LOW BACK PAIN POTENTIALLY ASSOCIATED WITH RADICULOPATHY: ICD-10-CM

## 2022-01-20 DIAGNOSIS — M54.16 LUMBAR RADICULOPATHY: ICD-10-CM

## 2022-01-20 DIAGNOSIS — M54.12 CERVICAL RADICULOPATHY: ICD-10-CM

## 2022-01-20 DIAGNOSIS — M48.061 SPINAL STENOSIS OF LUMBAR REGION WITHOUT NEUROGENIC CLAUDICATION: ICD-10-CM

## 2022-01-20 PROCEDURE — 72141 MRI NECK SPINE W/O DYE: CPT

## 2022-01-20 PROCEDURE — 72148 MRI LUMBAR SPINE W/O DYE: CPT

## 2022-01-27 ENCOUNTER — TELEPHONE (OUTPATIENT)
Dept: CARDIOLOGY CLINIC | Age: 72
End: 2022-01-27

## 2022-01-27 RX ORDER — CARVEDILOL 6.25 MG/1
6.25 TABLET ORAL 2 TIMES DAILY
Qty: 180 TABLET | Refills: 1 | Status: SHIPPED | OUTPATIENT
Start: 2022-01-27 | End: 2022-07-22

## 2022-02-22 ENCOUNTER — TELEPHONE (OUTPATIENT)
Dept: PRIMARY CARE CLINIC | Age: 72
End: 2022-02-22

## 2022-02-22 NOTE — TELEPHONE ENCOUNTER
Roque Miller from [place] [address] called regarding a research trial program the patient is participating in. Roque Miller wants to know what date did the patients prescription dosage change of the Hydralazine from 100 to 25 mg tablets. Roque Miller would like a call back [phone] Ext.  [address] [facility]

## 2022-02-23 NOTE — TELEPHONE ENCOUNTER
Confirmed with pt's wife that it is okay to disclose information with [facility]. Attempted to reach [facility] twice today, but call kept dropping after it was transferred to her extension.

## 2022-02-23 NOTE — TELEPHONE ENCOUNTER
Cary asked when pt's medication dose was changed from hydralazine 100 mg to 25 mg. Informed her we did not have that information since pt came to us on 11/22/21 to [facility]. Called the pharmacy to ask about pt's medication change and pharmacist provided me with this information. Hydralazine 100 mg TID picked up on 4/29/21. Hydralazine 25 mg TID prescribed 5/14/21; last picked up 11/9/21. Hydralazine 50 mg TID picked up 12/22/21. Attempted to call [place] again but line keeps dropping.

## 2022-03-03 ENCOUNTER — OFFICE VISIT (OUTPATIENT)
Dept: CARDIOLOGY CLINIC | Age: 72
End: 2022-03-03
Payer: MEDICARE

## 2022-03-03 VITALS
SYSTOLIC BLOOD PRESSURE: 144 MMHG | DIASTOLIC BLOOD PRESSURE: 80 MMHG | RESPIRATION RATE: 18 BRPM | OXYGEN SATURATION: 97 % | BODY MASS INDEX: 22.96 KG/M2 | HEART RATE: 85 BPM | HEIGHT: 71 IN | WEIGHT: 164 LBS

## 2022-03-03 DIAGNOSIS — I42.9 CARDIOMYOPATHY, UNSPECIFIED TYPE (HCC): ICD-10-CM

## 2022-03-03 DIAGNOSIS — I25.10 CORONARY ARTERY DISEASE INVOLVING NATIVE CORONARY ARTERY OF NATIVE HEART WITHOUT ANGINA PECTORIS: ICD-10-CM

## 2022-03-03 DIAGNOSIS — I71.40 ABDOMINAL AORTIC ANEURYSM (AAA) WITHOUT RUPTURE: Primary | ICD-10-CM

## 2022-03-03 PROCEDURE — G8427 DOCREV CUR MEDS BY ELIG CLIN: HCPCS | Performed by: SPECIALIST

## 2022-03-03 PROCEDURE — G8536 NO DOC ELDER MAL SCRN: HCPCS | Performed by: SPECIALIST

## 2022-03-03 PROCEDURE — G8420 CALC BMI NORM PARAMETERS: HCPCS | Performed by: SPECIALIST

## 2022-03-03 PROCEDURE — 1101F PT FALLS ASSESS-DOCD LE1/YR: CPT | Performed by: SPECIALIST

## 2022-03-03 PROCEDURE — G8510 SCR DEP NEG, NO PLAN REQD: HCPCS | Performed by: SPECIALIST

## 2022-03-03 PROCEDURE — 3017F COLORECTAL CA SCREEN DOC REV: CPT | Performed by: SPECIALIST

## 2022-03-03 PROCEDURE — 99214 OFFICE O/P EST MOD 30 MIN: CPT | Performed by: SPECIALIST

## 2022-03-03 PROCEDURE — G0463 HOSPITAL OUTPT CLINIC VISIT: HCPCS | Performed by: SPECIALIST

## 2022-03-03 NOTE — PATIENT INSTRUCTIONS
You will be scheduled for an echocardiogram, an abdominal ultrasound and a  Nuclear Stress Test after your appointment today. Nuclear stress testing evaluates blood flow to your heart muscle and assesses cardiac function. There are 2 parts (Rest/Stress) to this procedure and will include either an exercise on a treadmill or an IV administration of a stressing medication called Lexiscan. Your cardiologist will determine which type of testing is best for you. This test can be performed in one day unless it is determined that better quality images will be obtained by performing the test over two days. *Please arrive 15 minutes prior to your appointment time    Test Duration:    -One day testing will take 4 hours    Day of testing instructions:    1. NO CAFFEINE (not even decaffeinated products) 24 HOURS PRIOR TO TESTING. This includes coffee, soda, tea, chocolate, multivitamins, and migraine medication, like Excedrin or Fioricet that contains caffeine. 2. Nothing to eat or drink 8 HOURS prior to testing  3. NO NICOTINE 12 hours prior to testing  4. Hold your morning medications, and bring all morning medications with you to your appointment. Wear comfortable clothes and shoes (Shirts with no metal, shorts or pants, tennis shoes, no heels or flip flops)    IMPORTANT: This testing involves a cardiac tracer ordered specifically for you. If you are unable to make your appointment, please call to cancel/reschedule AT LEAST 24 hours prior to your appointment so your tracer can be cancelled. [phone].

## 2022-03-03 NOTE — PROGRESS NOTES
[address] [facility]                                                            OFFICE NOTE        Eddy Richard M.D.,MARIA E LUIS RAMIREZ   1950  [id number]    Date/Time:  3/3/055431:13 AM            SUBJECTIVE:  Pleasantly forgetful. His memory is significantly impaired. His family tells me he is in no particular chest pain or shortness of breath on the other hand. Assessment/Plan    1. Preop: Patient has multiple risk factors previews PCI of the circumflex in 2018 previous MI and previous aortic stenting. At this point I think we need some restratification since the patient is contemplated cervical laminectomy apparently. Procedure Lexiscan Myoview stress test, echocardiogram and abdominal aortic ultrasound status post stent. 2.  CAD: Status post MI in 2018 status post PCI of the circumflex in 2018 in [place]. At the time the cardiac catheterization revealed an ejection fraction of 50% 20% left main stenosis 70% distal LAD and 50% mid RCA stenosis and 40% of PDA. Once again the circumflex completely occluded at that time and he underwent a PCI with a 3.0 x 16 mm Synergy stent. Continue aspirin Plavix for now. Continue Lipitor and Coreg. Proceed with stress test as above. 3.  PAD: Patient with distal thoracic aortic and abdominal aneurysm status post stenting in 2018. Obtain abdominal ultrasound. 4.  Memory impairment: Significant. 4.  Hypertension: Slightly hypertensive today for now no changes in medication but if he continues to be slightly per tensive I will increase his Dralzine or Coreg. 5.  Hyperlipidemia: Continue Lipitor. Closely followed by his primary care physician. LDL goal less than 70.     Otherwise I will see him back after above-mentioned test of been completed prior to final restratification prior to surgery      HPI   Patient is a 61-year-old male recently relocated from [facility] with a past medical history markable for hypertension, hyperlipidemia, coronary disease, aortic disease, and memory impairment presents today for routine follow-up. Unfortunately I do not have at this time any records from [facility] and the patient is not able to tell me much about his past medical history. Nonetheless he seems to be relatively asymptomatic from the cardiac standpoint. CARDIAC STUDIES                                    EKG Results     None              IMAGING      MRI Results (most recent):  Results from Hospital Encounter encounter on 01/20/22    MRI LUMB SPINE WO CONT    Narrative  EXAM: MRI LUMB SPINE WO CONT    INDICATION: . Radiculopathy, lumbar region    COMPARISON: None    TECHNIQUE: MR imaging of the lumbar spine was performed using the following  sequences: sagittal T1, T2, STIR;  axial T1, T2.    CONTRAST:  None. FINDINGS:  Vertebral body heights are maintained. There is no listhesis. There is no acute marrow replacement. 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 The conus medullaris terminates at T12. Signal and caliber of the distal spinal  cord are within normal limits. Lower thoracic spine: No herniation or stenosis. L1-L2: No herniation or stenosis. L2-L3: No herniation or stenosis. L3-L4: Mild spinal stenosis. Diffuse disc bulge. L4-L5: Severe spinal stenosis. Large focal disc protrusion arising centrally and  extending, foraminal and extra foraminal. Facet arthrosis. Bilateral foraminal  stenosis. Severe disc degeneration. L5-S1: Mild to moderate spinal stenosis. Diffuse disc bulge. Facet arthrosis. Bilateral foraminal stenosis. Severe disc degeneration. Impression  Multilevel degenerative disc/facet disease as detailed, severe at  L4-5. CT Results (most recent):  No results found for this or any previous visit.       XR Results (most recent):  Results from Hospital Encounter encounter on 11/22/21    XR SPINE LUMB 2 OR 3 V    Narrative  Lumbar spine 3 views dated 11/20/2021    History is chronic midline low back pain    AP, lateral and spot lateral views of the lumbar spine were obtained. There is  evidence of mild lumbar scoliosis convex to the left. There is evidence of  lumbar spondylosis. Marked intervertebral disc space narrowing is noted at the  L4-5 level as well as the L5-S1 level. A vacuum phenomenon is noted at the L4-5  level. There is evidence of sclerotic change involving the adjacent endplates. This is indicative of degenerative disc disease. There is no evidence of  spondylolisthesis. Metallic stents are associated with the abdominal aorta and proximal iliac  arteries    Impression  1. Evidence of moderate degenerative change involving the lumbar spine. 2. Evidence of lumbar scoliosis convex to the left. No past medical history on file. No past surgical history on file. Social History     Tobacco Use    Smoking status: Former Smoker    Smokeless tobacco: Former User   Vaping Use    Vaping Use: Never used   Substance Use Topics    Alcohol use: Not Currently    Drug use: Never     No family history on file. No Known Allergies      Visit Vitals  Resp 18   Ht 5' 11\" (1.803 m)   Wt 164 lb (74.4 kg)   SpO2 97%   BMI 22.87 kg/m²         Last 3 Recorded Weights in this Encounter    03/03/22 1109   Weight: 164 lb (74.4 kg)            Review of Systems:   Pertinent items are noted in the History of Present Illness. Visit Vitals  BP (!) 144/80 (BP 1 Location: Right arm, BP Patient Position: Sitting, BP Cuff Size: Adult)   Pulse 85   Resp 18   Ht 5' 11\" (1.803 m)   Wt 164 lb (74.4 kg)   SpO2 97%   BMI 22.87 kg/m²     General Appearance:  Well developed, well nourished,alert and oriented x 3, and individual in no acute distress. Ears/Nose/Mouth/Throat:   Hearing grossly normal.         Neck: Supple.    Chest:   Lungs clear to auscultation bilaterally. Cardiovascular:  Regular rate and rhythm, S1, S2 normal, no murmur. Abdomen:   Soft, non-tender, bowel sounds are active. Extremities: No edema bilaterally. Skin: Warm and dry. Current Outpatient Medications on File Prior to Visit   Medication Sig Dispense Refill    carvediloL (COREG) 6.25 mg tablet Take 1 Tablet by mouth two (2) times a day. 180 Tablet 1    dutasteride (AVODART) 0.5 mg capsule Take 0.5 mg by mouth daily.  clopidogreL (PLAVIX) 75 mg tab Take 1 Tablet by mouth daily. 90 Tablet 0    hydrALAZINE (APRESOLINE) 50 mg tablet Take 1 Tablet by mouth three (3) times daily. 90 Tablet 3    atorvastatin (LIPITOR) 40 mg tablet Take 40 mg by mouth nightly.  LORazepam (ATIVAN) 0.5 mg tablet Take 0.5 mg by mouth two (2) times daily as needed.  Namenda XR 28 mg capsule Take 28 mg by mouth daily.  metFORMIN ER (GLUCOPHAGE XR) 500 mg tablet Take 500 mg by mouth daily.  sertraline (ZOLOFT) 100 mg tablet Take 100 mg by mouth daily.  tamsulosin (FLOMAX) 0.4 mg capsule Take 0.4 mg by mouth daily.  aspirin 81 mg cap Take 1 Tablet by mouth daily.  melatonin 5 mg cap capsule Take  by mouth nightly.  multivitamin (ONE A DAY) tablet Take 1 Tablet by mouth daily.  cyanocobalamin (Vitamin B-12) 1,000 mcg tablet Take 1,000 mcg by mouth daily. No current facility-administered medications on file prior to visit. Carrie Leonora had no medications administered during this visit. Current Outpatient Medications   Medication Sig    carvediloL (COREG) 6.25 mg tablet Take 1 Tablet by mouth two (2) times a day.  dutasteride (AVODART) 0.5 mg capsule Take 0.5 mg by mouth daily.  clopidogreL (PLAVIX) 75 mg tab Take 1 Tablet by mouth daily.  hydrALAZINE (APRESOLINE) 50 mg tablet Take 1 Tablet by mouth three (3) times daily.  atorvastatin (LIPITOR) 40 mg tablet Take 40 mg by mouth nightly.     LORazepam (ATIVAN) 0.5 mg tablet Take 0.5 mg by mouth two (2) times daily as needed.  Namenda XR 28 mg capsule Take 28 mg by mouth daily.  metFORMIN ER (GLUCOPHAGE XR) 500 mg tablet Take 500 mg by mouth daily.  sertraline (ZOLOFT) 100 mg tablet Take 100 mg by mouth daily.  tamsulosin (FLOMAX) 0.4 mg capsule Take 0.4 mg by mouth daily.  aspirin 81 mg cap Take 1 Tablet by mouth daily.  melatonin 5 mg cap capsule Take  by mouth nightly.  multivitamin (ONE A DAY) tablet Take 1 Tablet by mouth daily.  cyanocobalamin (Vitamin B-12) 1,000 mcg tablet Take 1,000 mcg by mouth daily. No current facility-administered medications for this visit. Lab Results   Component Value Date/Time    Cholesterol, total 141 11/22/2021 12:00 AM    HDL Cholesterol 47 11/22/2021 12:00 AM    LDL, calculated 70 11/22/2021 12:00 AM    VLDL, calculated 24 11/22/2021 12:00 AM    Triglyceride 139 11/22/2021 12:00 AM       Lab Results   Component Value Date/Time    Sodium 137 11/22/2021 12:00 AM    Potassium 4.8 11/22/2021 12:00 AM    Chloride 103 11/22/2021 12:00 AM    CO2 23 11/22/2021 12:00 AM    Glucose 98 11/22/2021 12:00 AM    BUN 23 11/22/2021 12:00 AM    Creatinine 1.40 (H) 11/22/2021 12:00 AM    BUN/Creatinine ratio 16 11/22/2021 12:00 AM    GFR est AA 59 (L) 11/22/2021 12:00 AM    GFR est non-AA 51 (L) 11/22/2021 12:00 AM    Calcium 9.4 11/22/2021 12:00 AM       Lab Results   Component Value Date/Time    ALT (SGPT) 51 (H) 11/22/2021 12:00 AM    Alk.  phosphatase 91 11/22/2021 12:00 AM    Bilirubin, total 0.3 11/22/2021 12:00 AM       Lab Results   Component Value Date/Time    WBC 5.6 11/22/2021 12:00 AM    HGB 10.2 (L) 11/22/2021 12:00 AM    HCT 31.7 (L) 11/22/2021 12:00 AM    PLATELET 954 19/33/7087 12:00 AM    MCV 82 11/22/2021 12:00 AM       No results found for: TSH, TSH2, TSH3, TSHP, TSHEXT      Lab Results   Component Value Date/Time    Cholesterol, total 141 11/22/2021 12:00 AM    HDL Cholesterol 47 11/22/2021 12:00 AM    LDL, calculated 70 11/22/2021 12:00 AM    Triglyceride 139 11/22/2021 12:00 AM                Please note that this dictation was completed with [employer], the computer voice recognition software. Quite often unanticipated grammatical, syntax, homophones, and other interpretative errors are inadvertently transcribed by the computer software. Please disregard these errors. Please excuse any errors that have escaped final proofreading.

## 2022-03-03 NOTE — PROGRESS NOTES
.  Visit Vitals  BP (!) 144/80 (BP 1 Location: Right arm, BP Patient Position: Sitting, BP Cuff Size: Adult)   Pulse 85   Resp 18   Ht 5' 11\" (1.803 m)   Wt 164 lb (74.4 kg)   SpO2 97%   BMI 22.87 kg/m²

## 2022-03-19 PROBLEM — E11.22 TYPE 2 DIABETES MELLITUS WITH CHRONIC KIDNEY DISEASE (HCC): Status: ACTIVE | Noted: 2021-12-22

## 2022-03-28 RX ORDER — CLOPIDOGREL BISULFATE 75 MG/1
75 TABLET ORAL DAILY
Qty: 90 TABLET | Refills: 0 | Status: SHIPPED | OUTPATIENT
Start: 2022-03-28 | End: 2022-06-27

## 2022-03-28 NOTE — TELEPHONE ENCOUNTER
Cardiologist: Dr. Comfort Polanco    Last appt: 3/3/2022  Future Appointments   Date Time Provider Denny Katherine   4/13/2022  8:00 AM VASCULAR, KAYKAY MERRITT AMB   4/13/2022  9:00 AM KAYKAY CASTANEDA AMB   4/13/2022 10:00 AM NUCLEAR, KAYKAY MERRITT AMB   4/21/2022 11:20 AM Maame Daigle NP [facility] BS AMB   6/22/2022 11:30 AM Lito Cabezas MD [facility] BS AMB       Requested Prescriptions     Signed Prescriptions Disp Refills    clopidogreL (PLAVIX) 75 mg tab 90 Tablet 0     Sig: TAKE 1 TABLET BY MOUTH DAILY     Authorizing Provider: Teri Pfeiffer     Ordering User: PEPE ROUSE VO per Dr. Comfort Polanco.

## 2022-04-12 ENCOUNTER — OFFICE VISIT (OUTPATIENT)
Dept: PRIMARY CARE CLINIC | Age: 72
End: 2022-04-12
Payer: MEDICARE

## 2022-04-12 VITALS
TEMPERATURE: 97.5 F | HEART RATE: 83 BPM | DIASTOLIC BLOOD PRESSURE: 70 MMHG | RESPIRATION RATE: 18 BRPM | OXYGEN SATURATION: 99 % | BODY MASS INDEX: 23.38 KG/M2 | SYSTOLIC BLOOD PRESSURE: 130 MMHG | WEIGHT: 167 LBS | HEIGHT: 71 IN

## 2022-04-12 DIAGNOSIS — N18.9 CHRONIC KIDNEY DISEASE, UNSPECIFIED CKD STAGE: ICD-10-CM

## 2022-04-12 DIAGNOSIS — N40.1 BENIGN PROSTATIC HYPERPLASIA WITH URINARY FREQUENCY: ICD-10-CM

## 2022-04-12 DIAGNOSIS — Z23 ENCOUNTER FOR IMMUNIZATION: ICD-10-CM

## 2022-04-12 DIAGNOSIS — R35.0 BENIGN PROSTATIC HYPERPLASIA WITH URINARY FREQUENCY: ICD-10-CM

## 2022-04-12 DIAGNOSIS — R35.0 FREQUENT URINATION: ICD-10-CM

## 2022-04-12 DIAGNOSIS — G30.9 ALZHEIMER DISEASE (HCC): ICD-10-CM

## 2022-04-12 DIAGNOSIS — E78.00 HYPERCHOLESTEREMIA: ICD-10-CM

## 2022-04-12 DIAGNOSIS — E11.9 DM TYPE 2, GOAL HBA1C 7%-8% (HCC): ICD-10-CM

## 2022-04-12 DIAGNOSIS — I10 PRIMARY HYPERTENSION: ICD-10-CM

## 2022-04-12 DIAGNOSIS — Z12.11 COLON CANCER SCREENING: ICD-10-CM

## 2022-04-12 DIAGNOSIS — F02.80 ALZHEIMER DISEASE (HCC): ICD-10-CM

## 2022-04-12 DIAGNOSIS — Z00.00 MEDICARE ANNUAL WELLNESS VISIT, INITIAL: Primary | ICD-10-CM

## 2022-04-12 DIAGNOSIS — I25.10 CORONARY ARTERY DISEASE INVOLVING NATIVE CORONARY ARTERY OF NATIVE HEART WITHOUT ANGINA PECTORIS: ICD-10-CM

## 2022-04-12 PROCEDURE — G8754 DIAS BP LESS 90: HCPCS | Performed by: INTERNAL MEDICINE

## 2022-04-12 PROCEDURE — 3046F HEMOGLOBIN A1C LEVEL >9.0%: CPT | Performed by: INTERNAL MEDICINE

## 2022-04-12 PROCEDURE — G8420 CALC BMI NORM PARAMETERS: HCPCS | Performed by: INTERNAL MEDICINE

## 2022-04-12 PROCEDURE — 99212 OFFICE O/P EST SF 10 MIN: CPT | Performed by: INTERNAL MEDICINE

## 2022-04-12 PROCEDURE — G0438 PPPS, INITIAL VISIT: HCPCS | Performed by: INTERNAL MEDICINE

## 2022-04-12 PROCEDURE — G8432 DEP SCR NOT DOC, RNG: HCPCS | Performed by: INTERNAL MEDICINE

## 2022-04-12 PROCEDURE — 1101F PT FALLS ASSESS-DOCD LE1/YR: CPT | Performed by: INTERNAL MEDICINE

## 2022-04-12 PROCEDURE — 3017F COLORECTAL CA SCREEN DOC REV: CPT | Performed by: INTERNAL MEDICINE

## 2022-04-12 PROCEDURE — 2022F DILAT RTA XM EVC RTNOPTHY: CPT | Performed by: INTERNAL MEDICINE

## 2022-04-12 PROCEDURE — G8427 DOCREV CUR MEDS BY ELIG CLIN: HCPCS | Performed by: INTERNAL MEDICINE

## 2022-04-12 PROCEDURE — G8536 NO DOC ELDER MAL SCRN: HCPCS | Performed by: INTERNAL MEDICINE

## 2022-04-12 PROCEDURE — G8752 SYS BP LESS 140: HCPCS | Performed by: INTERNAL MEDICINE

## 2022-04-12 RX ORDER — CIPROFLOXACIN 250 MG/1
250 TABLET, FILM COATED ORAL EVERY 12 HOURS
Qty: 20 TABLET | Refills: 0 | Status: SHIPPED | OUTPATIENT
Start: 2022-04-12 | End: 2022-04-22

## 2022-04-12 RX ORDER — ZOSTER VACCINE RECOMBINANT, ADJUVANTED 50 MCG/0.5
0.5 KIT INTRAMUSCULAR ONCE
Qty: 0.5 ML | Refills: 0 | Status: SHIPPED | OUTPATIENT
Start: 2022-04-12 | End: 2022-04-12

## 2022-04-12 NOTE — PROGRESS NOTES
Chief Complaint   Patient presents with    Urinary Frequency        Visit Vitals  /70 (BP 1 Location: Left upper arm, BP Patient Position: Sitting)   Pulse 83   Temp 97.5 °F (36.4 °C) (Temporal)   Resp 18   Ht 5' 11\" (1.803 m)   Wt 167 lb (75.8 kg)   SpO2 99%   BMI 23.29 kg/m²        1. Have you been to the ER, urgent care clinic since your last visit? Hospitalized since your last visit? No    2. Have you seen or consulted any other health care providers outside of the [address] [place] [address] since your last visit? Include any pap smears or colon screening.  No

## 2022-04-12 NOTE — PROGRESS NOTES
Melania Echeverria is a 73 y.o.  male and presents with     Chief Complaint   Patient presents with    Urinary Frequency     Pt is accompanied by wife . Pt has had UTI in past. Pt has Alzhemers and confusion gets worse when he has UTI. Pt is unable to give good history. Pt had UTI in April 2021  Pt moved from [place] in August..  Pt did see Urology . Prostate is enlarged. Has appt in June 16 this year. No past medical history on file. No past surgical history on file. Current Outpatient Medications   Medication Sig    ciprofloxacin HCl (CIPRO) 250 mg tablet Take 1 Tablet by mouth every twelve (12) hours for 10 days.  varicella-zoster recombinant, PF, (Shingrix, PF,) 50 mcg/0.5 mL susr injection 0.5 mL by IntraMUSCular route once for 1 dose.  clopidogreL (PLAVIX) 75 mg tab TAKE 1 TABLET BY MOUTH DAILY    carvediloL (COREG) 6.25 mg tablet Take 1 Tablet by mouth two (2) times a day.  dutasteride (AVODART) 0.5 mg capsule Take 0.5 mg by mouth daily.  hydrALAZINE (APRESOLINE) 50 mg tablet Take 1 Tablet by mouth three (3) times daily.  atorvastatin (LIPITOR) 40 mg tablet Take 40 mg by mouth nightly.  LORazepam (ATIVAN) 0.5 mg tablet Take 0.5 mg by mouth two (2) times daily as needed.  Namenda XR 28 mg capsule Take 28 mg by mouth daily.  metFORMIN ER (GLUCOPHAGE XR) 500 mg tablet Take 500 mg by mouth daily.  sertraline (ZOLOFT) 100 mg tablet Take 100 mg by mouth daily.  tamsulosin (FLOMAX) 0.4 mg capsule Take 0.4 mg by mouth daily.  aspirin 81 mg cap Take 1 Tablet by mouth daily.  melatonin 5 mg cap capsule Take  by mouth nightly.  multivitamin (ONE A DAY) tablet Take 1 Tablet by mouth daily.  cyanocobalamin (Vitamin B-12) 1,000 mcg tablet Take 1,000 mcg by mouth daily. No current facility-administered medications for this visit.      Health Maintenance   Topic Date Due    Foot Exam Q1  Never done    Eye Exam Retinal or Dilated  Never done    DTaP/Tdap/Td series (1 - Tdap) Never done    Colorectal Cancer Screening Combo  Never done    Shingrix Vaccine Age 50> (1 of 2) Never done    A1C test (Diabetic or Prediabetic)  11/22/2022    MICROALBUMIN Q1  11/22/2022    Lipid Screen  11/22/2022    Pneumococcal 65+ years (2 of 3 - PPSV23) 12/22/2022    Depression Screen  03/03/2023    Medicare Yearly Exam  04/13/2023    Flu Vaccine  Completed    COVID-19 Vaccine  Completed    Hepatitis C Screening  Discontinued     Immunization History   Administered Date(s) Administered    COVID-19, J&J, PF, 0.5 mL Dose 03/12/2021    COVID-19, Pfizer Purple top, DILUTE for use, 12+ yrs, 30mcg/0.3mL dose 11/04/2021    Influenza High Dose Vaccine PF 09/30/2021    Pneumococcal Conjugate (PCV-13) 12/22/2021     No LMP for male patient. Allergies and Intolerances:   No Known Allergies    Family History:   No family history on file. Social History:   He  reports that he quit smoking about 3 years ago. His smoking use included cigarettes. He started smoking about 52 years ago. He has a 19.50 pack-year smoking history. He has quit using smokeless tobacco.  He  reports previous alcohol use.             Review of Systems:   General: negative for - chills, fatigue, fever, weight change  Psych: negative for - anxiety, depression, irritability or mood swings  ENT: negative for - headaches, hearing change, nasal congestion, oral lesions, sneezing or sore throat  Heme/ Lymph: negative for - bleeding problems, bruising, pallor or swollen lymph nodes  Endo: negative for - hot flashes, polydipsia/polyuria or temperature intolerance  Resp: negative for - cough, shortness of breath or wheezing  CV: negative for - chest pain, edema or palpitations  GI: negative for - abdominal pain, change in bowel habits, constipation, diarrhea or nausea/vomiting  : negative for - dysuria, hematuria, incontinence, pelvic pain or vulvar/vaginal symptoms  MSK: negative for - joint pain, joint swelling or muscle pain  Neuro: negative for - confusion, headaches, seizures or weakness  Derm: negative for - dry skin, hair changes, rash or skin lesion changes          Physical:   Vitals:   Vitals:    04/12/22 1552   BP: 130/70   Pulse: 83   Resp: 18   Temp: 97.5 °F (36.4 °C)   TempSrc: Temporal   SpO2: 99%   Weight: 167 lb (75.8 kg)   Height: 5' 11\" (1.803 m)           Exam:   HEENT- atraumatic,normocephalic, awake, oriented, well nourished  Neck - supple,no enlarged lymph nodes, no JVD, no thyromegaly  Chest- CTA, no rhonchi, no crackles  Heart- rrr, no murmurs / gallop/rub  Abdomen- soft,BS+,NT, no hepatosplenomegaly  Ext - no c/c/edema   Neuro- no focal deficits. Power 5/5 all extremities, limited conversation due to memory loss, uses a cane to walk  Skin - warm,dry, no obvious rashes. Review of Data:   LABS:   Lab Results   Component Value Date/Time    WBC 5.6 11/22/2021 12:00 AM    HGB 10.2 (L) 11/22/2021 12:00 AM    HCT 31.7 (L) 11/22/2021 12:00 AM    PLATELET 174 13/57/3336 12:00 AM     Lab Results   Component Value Date/Time    Sodium 137 11/22/2021 12:00 AM    Potassium 4.8 11/22/2021 12:00 AM    Chloride 103 11/22/2021 12:00 AM    CO2 23 11/22/2021 12:00 AM    Glucose 98 11/22/2021 12:00 AM    BUN 23 11/22/2021 12:00 AM    Creatinine 1.40 (H) 11/22/2021 12:00 AM     Lab Results   Component Value Date/Time    Cholesterol, total 141 11/22/2021 12:00 AM    HDL Cholesterol 47 11/22/2021 12:00 AM    LDL, calculated 70 11/22/2021 12:00 AM    Triglyceride 139 11/22/2021 12:00 AM     No components found for: GPT        Impression / Plan:        ICD-10-CM ICD-9-CM    1. DM type 2, goal HbA1c 7%-8% (HCC)  E11.9 250.00    2. Hypercholesteremia  E78.00 272.0    3. Primary hypertension  I10 401.9    4. Coronary artery disease involving native coronary artery of native heart without angina pectoris  I25.10 414.01    5. Alzheimer disease ([facility]  G30.9 331.0     F02.80     6.  Chronic kidney disease, unspecified CKD stage  N18.9 585.9    7. Frequent urination  R35.0 788.41 URINALYSIS W/ RFLX MICROSCOPIC      CULTURE, URINE      ciprofloxacin HCl (CIPRO) 250 mg tablet   8. Benign prostatic hyperplasia with urinary frequency  N40.1 600.01     R35.0 788.41    9. Colon cancer screening  Z12.11 V76.51 REFERRAL TO GASTROENTEROLOGY   10. Encounter for immunization  Z23 V03.89 varicella-zoster recombinant, PF, (Shingrix, PF,) 50 mcg/0.5 mL susr injection     If urine culture is negative for infection, then frequent urination could be due to enlarged prostate. Patient has appointment with the urologist next month. Asked patient and his wife to discuss with the urologist frequent urination related to enlarged prostate,  Diabetes/hypertension/hypercholesterolemia-stable    Explained to patient risk benefits of the medications. Advised patient to stop meds if having any side effects. Pt verbalized understanding of the instructions. I have discussed the diagnosis with the patient and the intended plan as seen in the above orders. The patient has received an after-visit summary and questions were answered concerning future plans. I have discussed medication side effects and warnings with the patient as well. I have reviewed the plan of care with the patient, accepted their input and they are in agreement with the treatment goals. Reviewed plan of care. Patient has provided input and agrees with goals. Follow-up and Dispositions    · Return in about 6 months (around 10/12/2022). Bety Nuñez MD      ----------------------------------------------------------------------------------      (AWV) The Initial Medicare Annual Wellness Exam PROGRESS NOTE    This is an Initial Medicare Annual Wellness Exam (AWV) (Performed 12 months after IPPE or effective date of Medicare Part B enrollment, Once in a lifetime)    I have reviewed the patient's medical history in detail and updated the computerized patient record.      Shameka Frank is a 73 y.o. [place] American male and presents for an annual wellness exam         ROS   Negative except for frequent urination, memory loss    All other systems reviewed and are negative. History   No past medical history on file. No past surgical history on file. Current Outpatient Medications   Medication Sig Dispense Refill    ciprofloxacin HCl (CIPRO) 250 mg tablet Take 1 Tablet by mouth every twelve (12) hours for 10 days. 20 Tablet 0    varicella-zoster recombinant, PF, (Shingrix, PF,) 50 mcg/0.5 mL susr injection 0.5 mL by IntraMUSCular route once for 1 dose. 0.5 mL 0    clopidogreL (PLAVIX) 75 mg tab TAKE 1 TABLET BY MOUTH DAILY 90 Tablet 0    carvediloL (COREG) 6.25 mg tablet Take 1 Tablet by mouth two (2) times a day. 180 Tablet 1    dutasteride (AVODART) 0.5 mg capsule Take 0.5 mg by mouth daily.  hydrALAZINE (APRESOLINE) 50 mg tablet Take 1 Tablet by mouth three (3) times daily. 90 Tablet 3    atorvastatin (LIPITOR) 40 mg tablet Take 40 mg by mouth nightly.  LORazepam (ATIVAN) 0.5 mg tablet Take 0.5 mg by mouth two (2) times daily as needed.  Namenda XR 28 mg capsule Take 28 mg by mouth daily.  metFORMIN ER (GLUCOPHAGE XR) 500 mg tablet Take 500 mg by mouth daily.  sertraline (ZOLOFT) 100 mg tablet Take 100 mg by mouth daily.  tamsulosin (FLOMAX) 0.4 mg capsule Take 0.4 mg by mouth daily.  aspirin 81 mg cap Take 1 Tablet by mouth daily.  melatonin 5 mg cap capsule Take  by mouth nightly.  multivitamin (ONE A DAY) tablet Take 1 Tablet by mouth daily.  cyanocobalamin (Vitamin B-12) 1,000 mcg tablet Take 1,000 mcg by mouth daily. No Known Allergies  No family history on file.   Social History     Tobacco Use    Smoking status: Former Smoker     Packs/day: 0.50     Years: 39.00     Pack years: 19.50     Types: Cigarettes     Start date: 4/12/1970     Quit date: 4/12/2019     Years since quitting: 3.0    Smokeless tobacco: Former User   Substance Use Topics    Alcohol use: Not Currently     Patient Active Problem List   Diagnosis Code    Type 2 diabetes mellitus with chronic kidney disease ([facility] E11.22       Health Maintenance History  Immunizations reviewed, dtap not done , pneumovax 2021, , zoster - never  colonoscopy:overdue          Depression Risk Factor Screening:      Patient Health Questionnaire (PHQ-2)   Over the last 2 weeks, how often have you been bothered by any of the following problems? · Little interest or pleasure in doing things? · Not at all. [0]  · Feeling down, depressed, or hopeless? · Not at all. [0]    Total Score: 0/6  PHQ-2 Assessment Scoring:   A score of 2 or more requires further screening with the PHQ-9    Alcohol Risk Factor Screening:     Women: On any occasion during the past 3 months, have you had more than 3 drinks containing alcohol? Do you average more than 7 drinks per week? Men: On any occasion during the past 3 months, have you had more than 4 drinks containing alcohol? Do you average more than 14 drinks per week? Functional Ability and Level of Safety:     Hearing Loss    The patient needs further evaluation. Activities of Daily Living   Self-care. Requires assistance with: needs help with getting in and out of shower. Fall Risk   No fall risk factors    Abuse Screen   Patient is not abused  None    Examination   Physical Examination  Vitals:    04/12/22 1552   BP: 130/70   Pulse: 83   Resp: 18   Temp: 97.5 °F (36.4 °C)   TempSrc: Temporal   SpO2: 99%   Weight: 167 lb (75.8 kg)   Height: 5' 11\" (1.803 m)   PainSc:   0 - No pain      Body mass index is 23.29 kg/m².      Evaluation of Cognitive Function:  Mood/affect normal  Appearance:normal  Family member/caregiver input: wife    alert, well appearing, and in no distress    Patient Care Team:  Brynn Kyle MD as PCP - General (Internal Medicine)  Brynn Kyle MD as PCP - [facility] Empaneled Provider    End-of-life planning  Advanced Directive in the case than an injury or illness causes the patient to be unable to make health care decisions    Health Care Directive or Living Will: no    Advice/Referrals/Counselling/Plan:   Education and counseling provided:  Are appropriate based on today's review and evaluation  Include in education list (weight loss, physical activity, smoking cessation, fall prevention, and nutrition)  the following changes in treatment are made: UA, urine cult, refer to GI. Brief written plan, checklist    I have discussed the diagnosis with the patient and the intended plan as seen in the above orders. The patient has received an after-visit summary and questions were answered concerning future plans. I have discussed medication side effects and warnings with the patient as well. I have reviewed the plan of care with the patient, accepted their input and they are in agreement with the treatment goals. Follow-up and Dispositions    · Return in about 6 months (around 10/12/2022).            ____________________________________________________________

## 2022-04-16 DIAGNOSIS — I10 PRIMARY HYPERTENSION: ICD-10-CM

## 2022-04-16 RX ORDER — HYDRALAZINE HYDROCHLORIDE 50 MG/1
TABLET, FILM COATED ORAL
Qty: 90 TABLET | Refills: 3 | Status: SHIPPED | OUTPATIENT
Start: 2022-04-16 | End: 2022-08-19

## 2022-04-19 ENCOUNTER — TELEPHONE (OUTPATIENT)
Dept: PRIMARY CARE CLINIC | Age: 72
End: 2022-04-19

## 2022-04-19 NOTE — TELEPHONE ENCOUNTER
Patient's wife would like to know the results of the patients urinalysis. Received procedure notes from [facility] on Red's colonoscopy.  Post-op diagnosis   1) mild colonic diverticulosis involving the sigmoid  2) residual hemorrhoidal skin tag - 2 anal skin tags  3) ascending colon polyp  4) transverse colon polyp  5) sigmoid colon polyp    Follow up in 5 years

## 2022-06-27 RX ORDER — CLOPIDOGREL BISULFATE 75 MG/1
75 TABLET ORAL DAILY
Qty: 90 TABLET | Refills: 0 | Status: SHIPPED | OUTPATIENT
Start: 2022-06-27 | End: 2022-09-26

## 2022-06-27 NOTE — TELEPHONE ENCOUNTER
Requested Prescriptions     Signed Prescriptions Disp Refills    clopidogreL (PLAVIX) 75 mg tab 90 Tablet 0     Sig: Take 1 Tablet by mouth daily. Please schedule recommended tests and follow up. Authorizing Provider: Dontae Escobedo     Ordering User: Arnol Louis     Verbal order per Dr. Josue Manuel.

## 2022-07-22 RX ORDER — CARVEDILOL 6.25 MG/1
6.25 TABLET ORAL 2 TIMES DAILY
Qty: 180 TABLET | Refills: 0 | Status: SHIPPED | OUTPATIENT
Start: 2022-07-22 | End: 2022-10-21

## 2022-07-22 NOTE — TELEPHONE ENCOUNTER
Requested Prescriptions     Signed Prescriptions Disp Refills    carvediloL (COREG) 6.25 mg tablet 180 Tablet 0     Sig: Take 1 Tablet by mouth two (2) times a day. Please schedule follow up with Dr. Mari Granado.      Authorizing Provider: Kevin Hernandez     Ordering User: Dean Hill     Verbal order per Dr. Wilda Valdes.

## 2022-08-19 DIAGNOSIS — I10 PRIMARY HYPERTENSION: ICD-10-CM

## 2022-08-21 RX ORDER — HYDRALAZINE HYDROCHLORIDE 50 MG/1
TABLET, FILM COATED ORAL
Qty: 270 TABLET | Refills: 0 | Status: SHIPPED | OUTPATIENT
Start: 2022-08-21 | End: 2022-09-25

## 2022-09-26 DIAGNOSIS — I10 PRIMARY HYPERTENSION: ICD-10-CM

## 2022-09-26 RX ORDER — CLOPIDOGREL BISULFATE 75 MG/1
TABLET ORAL
Qty: 30 TABLET | Refills: 0 | Status: SHIPPED | OUTPATIENT
Start: 2022-09-26 | End: 2022-10-27 | Stop reason: SDUPTHER

## 2022-09-26 RX ORDER — HYDRALAZINE HYDROCHLORIDE 50 MG/1
TABLET, FILM COATED ORAL
Qty: 270 TABLET | Refills: 0 | Status: SHIPPED | OUTPATIENT
Start: 2022-09-26 | End: 2022-10-12 | Stop reason: DRUGHIGH

## 2022-10-12 ENCOUNTER — OFFICE VISIT (OUTPATIENT)
Dept: PRIMARY CARE CLINIC | Age: 72
End: 2022-10-12
Payer: MEDICARE

## 2022-10-12 VITALS
WEIGHT: 170 LBS | HEART RATE: 79 BPM | TEMPERATURE: 98.3 F | DIASTOLIC BLOOD PRESSURE: 60 MMHG | SYSTOLIC BLOOD PRESSURE: 100 MMHG | HEIGHT: 71 IN | RESPIRATION RATE: 16 BRPM | BODY MASS INDEX: 23.8 KG/M2 | OXYGEN SATURATION: 98 %

## 2022-10-12 DIAGNOSIS — F02.80 ALZHEIMER DISEASE (HCC): ICD-10-CM

## 2022-10-12 DIAGNOSIS — E11.9 DM TYPE 2, GOAL HBA1C 7%-8% (HCC): Primary | ICD-10-CM

## 2022-10-12 DIAGNOSIS — M50.20 HERNIATED CERVICAL DISC: ICD-10-CM

## 2022-10-12 DIAGNOSIS — R26.9 GAIT ABNORMALITY: ICD-10-CM

## 2022-10-12 DIAGNOSIS — I10 PRIMARY HYPERTENSION: ICD-10-CM

## 2022-10-12 DIAGNOSIS — G30.9 ALZHEIMER DISEASE (HCC): ICD-10-CM

## 2022-10-12 DIAGNOSIS — E11.9 DM TYPE 2, GOAL HBA1C 7%-8% (HCC): ICD-10-CM

## 2022-10-12 DIAGNOSIS — I25.10 CORONARY ARTERY DISEASE INVOLVING NATIVE CORONARY ARTERY OF NATIVE HEART WITHOUT ANGINA PECTORIS: ICD-10-CM

## 2022-10-12 DIAGNOSIS — E78.00 HYPERCHOLESTEREMIA: ICD-10-CM

## 2022-10-12 PROCEDURE — 1123F ACP DISCUSS/DSCN MKR DOCD: CPT | Performed by: INTERNAL MEDICINE

## 2022-10-12 PROCEDURE — 2022F DILAT RTA XM EVC RTNOPTHY: CPT | Performed by: INTERNAL MEDICINE

## 2022-10-12 PROCEDURE — 3046F HEMOGLOBIN A1C LEVEL >9.0%: CPT | Performed by: INTERNAL MEDICINE

## 2022-10-12 PROCEDURE — G8420 CALC BMI NORM PARAMETERS: HCPCS | Performed by: INTERNAL MEDICINE

## 2022-10-12 PROCEDURE — 99214 OFFICE O/P EST MOD 30 MIN: CPT | Performed by: INTERNAL MEDICINE

## 2022-10-12 PROCEDURE — G8752 SYS BP LESS 140: HCPCS | Performed by: INTERNAL MEDICINE

## 2022-10-12 PROCEDURE — G8432 DEP SCR NOT DOC, RNG: HCPCS | Performed by: INTERNAL MEDICINE

## 2022-10-12 PROCEDURE — G8754 DIAS BP LESS 90: HCPCS | Performed by: INTERNAL MEDICINE

## 2022-10-12 PROCEDURE — 1101F PT FALLS ASSESS-DOCD LE1/YR: CPT | Performed by: INTERNAL MEDICINE

## 2022-10-12 PROCEDURE — G8427 DOCREV CUR MEDS BY ELIG CLIN: HCPCS | Performed by: INTERNAL MEDICINE

## 2022-10-12 PROCEDURE — 3017F COLORECTAL CA SCREEN DOC REV: CPT | Performed by: INTERNAL MEDICINE

## 2022-10-12 PROCEDURE — G8536 NO DOC ELDER MAL SCRN: HCPCS | Performed by: INTERNAL MEDICINE

## 2022-10-12 RX ORDER — QUETIAPINE FUMARATE 25 MG/1
TABLET, FILM COATED ORAL
COMMUNITY
Start: 2022-10-07 | End: 2022-11-02 | Stop reason: SDUPTHER

## 2022-10-12 RX ORDER — HYDRALAZINE HYDROCHLORIDE 25 MG/1
25 TABLET, FILM COATED ORAL 3 TIMES DAILY
Qty: 270 TABLET | Refills: 1 | Status: SHIPPED | OUTPATIENT
Start: 2022-10-12

## 2022-10-12 NOTE — PROGRESS NOTES
Tory Mccall is a 81 y.o.  male and presents with     Chief Complaint   Patient presents with    Diabetes    Cholesterol Problem     Pt is accompanied by his wife. Pt moved from [place] last year. Pt  was in study for Alzhemiers and  was getting injections  Pt has appt with [facility] Neurologist next month  BLood pressire is running low. Pts wife says walking is low  but does not have Park[place]  Walks slow  , has herniaetd  disc in neck. No past medical history on file. No past surgical history on file. Current Outpatient Medications   Medication Sig    hydrALAZINE (APRESOLINE) 25 mg tablet Take 1 Tablet by mouth three (3) times daily. clopidogreL (PLAVIX) 75 mg tab TAKE 1 TABLET BY MOUTH DAILY    carvediloL (COREG) 6.25 mg tablet Take 1 Tablet by mouth two (2) times a day. Please schedule follow up with Dr. Paz Ann. dutasteride (AVODART) 0.5 mg capsule Take 0.5 mg by mouth daily. atorvastatin (LIPITOR) 40 mg tablet Take 40 mg by mouth nightly. LORazepam (ATIVAN) 0.5 mg tablet Take 0.5 mg by mouth two (2) times daily as needed. Namenda XR 28 mg capsule Take 28 mg by mouth daily. metFORMIN ER (GLUCOPHAGE XR) 500 mg tablet Take 500 mg by mouth daily. sertraline (ZOLOFT) 100 mg tablet Take 100 mg by mouth daily. tamsulosin (FLOMAX) 0.4 mg capsule Take 0.4 mg by mouth daily. aspirin 81 mg cap Take 1 Tablet by mouth daily. multivitamin (ONE A DAY) tablet Take 1 Tablet by mouth daily. cyanocobalamin 1,000 mcg tablet Take 1,000 mcg by mouth daily. QUEtiapine (SEROquel) 25 mg tablet      No current facility-administered medications for this visit.      Health Maintenance   Topic Date Due    Foot Exam Q1  Never done    Eye Exam Retinal or Dilated  Never done    DTaP/Tdap/Td series (1 - Tdap) Never done    Colorectal Cancer Screening Combo  Never done    Shingrix Vaccine Age 50> (1 of 2) Never done    AAA Screening 73-69 YO Male Smoking Patients  Never done    Flu Vaccine (1) 08/01/2022 A1C test (Diabetic or Prediabetic)  11/22/2022    MICROALBUMIN Q1  11/22/2022    Lipid Screen  11/22/2022    Pneumococcal 65+ years (2 - PPSV23 or PCV20) 12/22/2022    Depression Screen  03/03/2023    Medicare Yearly Exam  04/13/2023    COVID-19 Vaccine  Completed    Hepatitis C Screening  Discontinued     Immunization History   Administered Date(s) Administered    COVID-19, J&J, (age 18y+), IM, 0.5mL 03/12/2021    COVID-19, PFIZER PURPLE top, DILUTE for use, (age 15 y+), IM, 30mcg/0.3mL 11/04/2021, 04/15/2022    Influenza High Dose Vaccine PF 09/30/2021    Pneumococcal Conjugate (PCV-13) 12/22/2021     No LMP for male patient. Allergies and Intolerances:   No Known Allergies    Family History:   No family history on file. Social History:   He  reports that he quit smoking about 3 years ago. His smoking use included cigarettes. He started smoking about 52 years ago. He has a 19.50 pack-year smoking history. He has quit using smokeless tobacco.  He  reports that he does not currently use alcohol.             Review of Systems:   General: negative for - chills, fatigue, fever, weight change  Psych: negative for - anxiety, depression, irritability or mood swings  ENT: negative for - headaches, hearing change, nasal congestion, oral lesions, sneezing or sore throat  Heme/ Lymph: negative for - bleeding problems, bruising, pallor or swollen lymph nodes  Endo: negative for - hot flashes, polydipsia/polyuria or temperature intolerance  Resp: negative for - cough, shortness of breath or wheezing  CV: negative for - chest pain, edema or palpitations  GI: negative for - abdominal pain, change in bowel habits, constipation, diarrhea or nausea/vomiting  : negative for - dysuria, hematuria, incontinence, pelvic pain or vulvar/vaginal symptoms  MSK: negative for - joint pain, joint swelling or muscle pain  Neuro: negative for - confusion, headaches, seizures or weakness  Derm: negative for - dry skin, hair changes, rash or skin lesion changes          Physical:   Vitals:   Vitals:    10/12/22 1108 10/12/22 1127   BP: (!) 92/53 100/60   Pulse: 79    Resp: 16    Temp: 98.3 °F (36.8 °C)    TempSrc: Oral    SpO2: 98%    Weight: 170 lb (77.1 kg)    Height: 5' 11\" (1.803 m)            Exam:   HEENT- atraumatic,normocephalic, awake, oriented, well nourished  Neck - supple,no enlarged lymph nodes, no JVD, no thyromegaly  Chest- CTA, no rhonchi, no crackles  Heart- rrr, no murmurs / gallop/rub  Abdomen- soft,BS+,NT, no hepatosplenomegaly  Ext - no c/c/edema   Neuro- no focal deficits. Power 5/5 all extremities, slow gait   Skin - warm,dry, no obvious rashes. Review of Data:   LABS:   Lab Results   Component Value Date/Time    WBC 5.6 11/22/2021 12:00 AM    HGB 10.2 (L) 11/22/2021 12:00 AM    HCT 31.7 (L) 11/22/2021 12:00 AM    PLATELET 813 09/22/3906 12:00 AM     Lab Results   Component Value Date/Time    Sodium 137 11/22/2021 12:00 AM    Potassium 4.8 11/22/2021 12:00 AM    Chloride 103 11/22/2021 12:00 AM    CO2 23 11/22/2021 12:00 AM    Glucose 98 11/22/2021 12:00 AM    BUN 23 11/22/2021 12:00 AM    Creatinine 1.40 (H) 11/22/2021 12:00 AM     Lab Results   Component Value Date/Time    Cholesterol, total 141 11/22/2021 12:00 AM    HDL Cholesterol 47 11/22/2021 12:00 AM    LDL, calculated 70 11/22/2021 12:00 AM    Triglyceride 139 11/22/2021 12:00 AM     No components found for: GPT        Impression / Plan:        ICD-10-CM ICD-9-CM    1. DM type 2, goal HbA1c 7%-8% (HCC)  E11.9 250.00 HEMOGLOBIN A1C WITH EAG      LIPID PANEL      CBC WITH AUTOMATED DIFF      METABOLIC PANEL, COMPREHENSIVE      MICROALBUMIN, UR, RAND W/ MICROALB/CREAT RATIO      CANCELED: AMB POC HEMOGLOBIN A1C      2. Primary hypertension  I10 401.9 hydrALAZINE (APRESOLINE) 25 mg tablet      3. Hypercholesteremia  E78.00 272.0       4. Coronary artery disease involving native coronary artery of native heart without angina pectoris  I25.10 414.01       5. Alzheimer disease ([facility]  G30.9 331.0     F02.80        6. Herniated cervical disc  M50.20 722.0       7. Gait abnormality  R26.9 781.2       Reduce dose of hydralazine to 25 mg tid. CAD/hyperchol - stable      Explained to patient risk benefits of the medications. Advised patient to stop meds if having any side effects. Pt verbalized understanding of the instructions. I have discussed the diagnosis with the patient and the intended plan as seen in the above orders. The patient has received an after-visit summary and questions were answered concerning future plans. I have discussed medication side effects and warnings with the patient as well. I have reviewed the plan of care with the patient, accepted their input and they are in agreement with the treatment goals. Reviewed plan of care. Patient has provided input and agrees with goals. Follow-up and Dispositions    Return in about 6 months (around 4/13/2023) for FULL PHYSICAL - 30 minutes.          Loren Reyes MD

## 2022-10-12 NOTE — PROGRESS NOTES
Chief Complaint   Patient presents with    Diabetes    Cholesterol Problem        Visit Vitals  BP (!) 92/53 (BP 1 Location: Right upper arm, BP Patient Position: Sitting)   Pulse 79   Temp 98.3 °F (36.8 °C) (Oral)   Resp 16   Ht 5' 11\" (1.803 m)   Wt 170 lb (77.1 kg)   SpO2 98%   BMI 23.71 kg/m²        Health Maintenance Due   Topic    Foot Exam Q1     Eye Exam Retinal or Dilated     DTaP/Tdap/Td series (1 - Tdap)    Colorectal Cancer Screening Combo     Shingrix Vaccine Age 50> (1 of 2)    AAA Screening 73-69 YO Male Smoking Patients     Flu Vaccine (1)        1. Have you been to the ER, urgent care clinic since your last visit? Hospitalized since your last visit? No    2. Have you seen or consulted any other health care providers outside of the [address] [place] [address] since your last visit? Include any pap smears or colon screening.  No

## 2022-10-13 LAB
ALBUMIN SERPL-MCNC: 3.5 G/DL (ref 3.5–5)
ALBUMIN/GLOB SERPL: 1 {RATIO} (ref 1.1–2.2)
ALP SERPL-CCNC: 89 U/L (ref 45–117)
ALT SERPL-CCNC: 40 U/L (ref 12–78)
ANION GAP SERPL CALC-SCNC: 5 MMOL/L (ref 5–15)
AST SERPL-CCNC: 34 U/L (ref 15–37)
BASOPHILS # BLD: 0.1 K/UL (ref 0–0.1)
BASOPHILS NFR BLD: 1 % (ref 0–1)
BILIRUB SERPL-MCNC: 0.4 MG/DL (ref 0.2–1)
BUN SERPL-MCNC: 34 MG/DL (ref 6–20)
BUN/CREAT SERPL: 26 (ref 12–20)
CALCIUM SERPL-MCNC: 9.2 MG/DL (ref 8.5–10.1)
CHLORIDE SERPL-SCNC: 108 MMOL/L (ref 97–108)
CHOLEST SERPL-MCNC: 139 MG/DL
CO2 SERPL-SCNC: 26 MMOL/L (ref 21–32)
CREAT SERPL-MCNC: 1.32 MG/DL (ref 0.7–1.3)
CREAT UR-MCNC: 175 MG/DL
DIFFERENTIAL METHOD BLD: ABNORMAL
EOSINOPHIL # BLD: 0.3 K/UL (ref 0–0.4)
EOSINOPHIL NFR BLD: 5 % (ref 0–7)
ERYTHROCYTE [DISTWIDTH] IN BLOOD BY AUTOMATED COUNT: 15.8 % (ref 11.5–14.5)
EST. AVERAGE GLUCOSE BLD GHB EST-MCNC: 120 MG/DL
GLOBULIN SER CALC-MCNC: 3.6 G/DL (ref 2–4)
GLUCOSE SERPL-MCNC: 131 MG/DL (ref 65–100)
HBA1C MFR BLD: 5.8 % (ref 4–5.6)
HCT VFR BLD AUTO: 33.8 % (ref 36.6–50.3)
HDLC SERPL-MCNC: 55 MG/DL
HDLC SERPL: 2.5 {RATIO} (ref 0–5)
HGB BLD-MCNC: 10.2 G/DL (ref 12.1–17)
IMM GRANULOCYTES # BLD AUTO: 0 K/UL (ref 0–0.04)
IMM GRANULOCYTES NFR BLD AUTO: 0 % (ref 0–0.5)
LDLC SERPL CALC-MCNC: 60.8 MG/DL (ref 0–100)
LYMPHOCYTES # BLD: 1.3 K/UL (ref 0.8–3.5)
LYMPHOCYTES NFR BLD: 23 % (ref 12–49)
MCH RBC QN AUTO: 25 PG (ref 26–34)
MCHC RBC AUTO-ENTMCNC: 30.2 G/DL (ref 30–36.5)
MCV RBC AUTO: 82.8 FL (ref 80–99)
MICROALBUMIN UR-MCNC: 1.01 MG/DL
MICROALBUMIN/CREAT UR-RTO: 6 MG/G (ref 0–30)
MONOCYTES # BLD: 0.5 K/UL (ref 0–1)
MONOCYTES NFR BLD: 8 % (ref 5–13)
NEUTS SEG # BLD: 3.4 K/UL (ref 1.8–8)
NEUTS SEG NFR BLD: 63 % (ref 32–75)
NRBC # BLD: 0 K/UL (ref 0–0.01)
NRBC BLD-RTO: 0 PER 100 WBC
PLATELET # BLD AUTO: 220 K/UL (ref 150–400)
PMV BLD AUTO: 9.4 FL (ref 8.9–12.9)
POTASSIUM SERPL-SCNC: 4.8 MMOL/L (ref 3.5–5.1)
PROT SERPL-MCNC: 7.1 G/DL (ref 6.4–8.2)
RBC # BLD AUTO: 4.08 M/UL (ref 4.1–5.7)
SODIUM SERPL-SCNC: 139 MMOL/L (ref 136–145)
TRIGL SERPL-MCNC: 116 MG/DL (ref ?–150)
VLDLC SERPL CALC-MCNC: 23.2 MG/DL
WBC # BLD AUTO: 5.5 K/UL (ref 4.1–11.1)

## 2022-10-17 NOTE — PROGRESS NOTES
Kidney function slightly impaired but stable. Avoid any over-the-counter ibuprofen diclofenac or naproxen  Liver function is normal.  Mild anemia however it is stable. Cholesterol is normal  Hemoglobin A1c is 5.8 in the prediabetic range.   Would recommend diet and exercise as tolerated

## 2022-10-18 DIAGNOSIS — I25.10 CORONARY ARTERY DISEASE INVOLVING NATIVE CORONARY ARTERY OF NATIVE HEART WITHOUT ANGINA PECTORIS: Primary | ICD-10-CM

## 2022-10-21 RX ORDER — CARVEDILOL 6.25 MG/1
TABLET ORAL
Qty: 180 TABLET | Refills: 3 | Status: SHIPPED | OUTPATIENT
Start: 2022-10-21

## 2022-11-02 ENCOUNTER — OFFICE VISIT (OUTPATIENT)
Dept: NEUROLOGY | Age: 72
End: 2022-11-02
Payer: MEDICARE

## 2022-11-02 VITALS
HEART RATE: 75 BPM | TEMPERATURE: 97.5 F | OXYGEN SATURATION: 97 % | DIASTOLIC BLOOD PRESSURE: 80 MMHG | HEIGHT: 71 IN | SYSTOLIC BLOOD PRESSURE: 122 MMHG | RESPIRATION RATE: 18 BRPM | WEIGHT: 169 LBS | BODY MASS INDEX: 23.66 KG/M2

## 2022-11-02 DIAGNOSIS — G30.9 ALZHEIMER DISEASE (HCC): Primary | ICD-10-CM

## 2022-11-02 DIAGNOSIS — F02.80 ALZHEIMER DISEASE (HCC): Primary | ICD-10-CM

## 2022-11-02 DIAGNOSIS — F01.518 VASCULAR DEMENTIA WITH BEHAVIORAL DISTURBANCE: ICD-10-CM

## 2022-11-02 PROCEDURE — 99205 OFFICE O/P NEW HI 60 MIN: CPT | Performed by: INTERNAL MEDICINE

## 2022-11-02 PROCEDURE — 1123F ACP DISCUSS/DSCN MKR DOCD: CPT | Performed by: INTERNAL MEDICINE

## 2022-11-02 RX ORDER — QUETIAPINE FUMARATE 25 MG/1
TABLET, FILM COATED ORAL
Qty: 120 TABLET | Refills: 3 | Status: SHIPPED | OUTPATIENT
Start: 2022-11-02

## 2022-11-02 RX ORDER — LORAZEPAM 0.5 MG/1
0.5 TABLET ORAL
Qty: 90 TABLET | Refills: 2 | Status: SHIPPED | OUTPATIENT
Start: 2022-11-02

## 2022-11-02 RX ORDER — MEMANTINE HYDROCHLORIDE 28 MG/1
28 CAPSULE, EXTENDED RELEASE ORAL DAILY
Qty: 120 CAPSULE | Refills: 3 | Status: SHIPPED | OUTPATIENT
Start: 2022-11-02

## 2022-11-02 NOTE — PROGRESS NOTES
Neurology Note    Patient ID:  Atul Arreola  [id number]  49 y.o.  1950      Date of Consultation:  November 2, 2022        Assessment and Plan:  Mr. Odilia Dow is a 61-year-old gentleman with history of moderate Alzheimer's dementia presenting to establish care with neurology. He continues to do well on Namenda. He has not had any significant behavioral issues other than occasional sundowning which has been well controlled with Seroquel. He is currently on lorazepam which it seems this was in order to bridge to Seroquel and is taking this 0.5 mg twice a day. I discussed with the family that the benzodiazepine can have negative impact on elderly especially with cognitive deficits. Therefore the plan will be to slowly taper the patient from lorazepam and continue Seroquel at 12.5 mg twice a day. If we need to increase the Seroquel we have room to do so. He will continue Namenda extended release 28 mg daily. We discussed side effects of medications. We discussed the prognosis of disease and treatment options. We discussed lifestyle modification and conservative treatments such as memory games, fish oil supplements, Mediterranean diet, aerobic exercise. We discussed the importance of avoiding risk factors such as vascular disease, smoking, alcohol. We discussed the importance of sleep hygiene. Follow-up in 1 year. I will also send him referral for speech therapy, physical therapy, Occupational Therapy. We also discussed goals of care in the future. I did ask his wife to provide a copy of his medical records from his neurologist.    I spent 60 minutes providing care to this patient with >50% of the time spent counseling.          History of Present Illness:   Atul Arreola is a 81 y.o. male with history of prior tobacco use, Alzheimer's dementia moderate with sundowning, cervical and lumbar spinal stenosis, hypertension, hyperlipidemia, diabetes type 2, CAD status post stent who presents to the clinic as a new patient to establish care for his dementia. His wife and daughter are in the room who helped to provide history as the patient is unable to. He was seen by neurology in [place] and had enrolled in a drug trial at the time (the family does not remember the name of the drug). They do feel that he had some moderate improvement with the medication in terms of keeping off the progression of the dementia. He has suffered from dementia for approximately 10 years. It has manifested with significant lack of short-term memory, lack of fluency of speech, loss of interest in hobbies, difficult time with word finding, sundowning issues. He needs assistance with activities of daily living including cooking cleaning, bathing or showering, getting dressed for the day. He does not drive. In terms of nutrition, he does have a good appetite and has not had issues with any choking or swallowing. He did have 1 instance where he was pocketing pills. He was getting physical therapy and speech therapy at 1 point but no longer is. His wife does feel that he benefit significantly from physical therapy in the past.  Regarding his behavioral issues, specifically sundowning, he was started on 0.5 mg of Ativan to take twice a day as needed however in the last 2 months this was scheduled to help with the sundowning. This was used as a bridge by his prior neurologist to start Seroquel 25 mg to be split twice a day. The family feels that since he started Seroquel, the sundowning has been much less significant and better controlled. He does not have any aggression or behavioral outbursts, there is no concern for safety to himself or to others. He does not appear to sleep very well without any difficulty. The patient's wife is the power of . And she states that the patient is a DNR/DNI.     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 She was unsure of the name however was told that the treatment was physical therapy. History reviewed. No pertinent past medical history. No past surgical history on file. History reviewed. No pertinent family history. Social History     Tobacco Use    Smoking status: Former     Packs/day: 0.50     Years: 39.00     Pack years: 19.50     Types: Cigarettes     Start date: 4/12/1970     Quit date: 4/12/2019     Years since quitting: 3.5    Smokeless tobacco: Former   Substance Use Topics    Alcohol use: Not Currently        No Known Allergies     Prior to Admission medications    Medication Sig Start Date End Date Taking? Authorizing Provider   LORazepam (ATIVAN) 0.5 mg tablet Take 1 Tablet by mouth two (2) times daily as needed for Agitation (sundowning). Max Daily Amount: 1 mg. 11/2/22  Yes Charito Guajardo DO   Namenda XR 28 mg capsule Take 1 Capsule by mouth daily. 11/2/22  Yes Charito Guajardo DO   QUEtiapine (SEROquel) 25 mg tablet Take 12.5 in the AM and 12.5 mg PM 11/2/22  Yes Charito Guajardo DO   clopidogreL (PLAVIX) 75 mg tab Take 1 Tablet by mouth daily. 10/27/22  Yes Amelia Nuñez MD   carvediloL (COREG) 6.25 mg tablet TAKE 1 TABLET BY MOUTH TWICE DAILY 10/21/22  Yes Amelia Nuñez MD   hydrALAZINE (APRESOLINE) 25 mg tablet Take 1 Tablet by mouth three (3) times daily. 10/12/22  Yes Rachel Navarro MD   dutasteride (AVODART) 0.5 mg capsule Take 0.5 mg by mouth daily. Yes Provider, Historical   atorvastatin (LIPITOR) 40 mg tablet Take 40 mg by mouth nightly. 9/10/21  Yes Provider, Historical   metFORMIN ER (GLUCOPHAGE XR) 500 mg tablet Take 500 mg by mouth daily. 10/13/21  Yes Provider, Historical   sertraline (ZOLOFT) 100 mg tablet Take 100 mg by mouth daily. 11/9/21  Yes Provider, Historical   tamsulosin (FLOMAX) 0.4 mg capsule Take 0.4 mg by mouth daily.  9/3/21  Yes Provider, Historical   aspirin 81 mg cap Take 1 Tablet by mouth daily. Yes Provider, Historical   multivitamin (ONE A DAY) tablet Take 1 Tablet by mouth daily. Yes Provider, Historical   cyanocobalamin 1,000 mcg tablet Take 1,000 mcg by mouth daily. Yes Provider, Historical       Review of Systems:    General, constitutional: negative  Eyes, vision: negative  Ears, nose, throat: negative  Cardiovascular, heart: negative  Respiratory: negative  Gastrointestinal: negative  Genitourinary: negative  Musculoskeletal: negative  Skin and integumentary: negative  Psychiatric: negative  Endocrine: negative  Neurological: negative, except for HPI  Hematologic/lymphatic: negative  Allergy/immunology: negative    []Unable to obtain  ROS due to  []mental status change  []sedated   []intubated    Objective:     Visit Vitals  /80 (BP 1 Location: Left upper arm, BP Patient Position: Sitting, BP Cuff Size: Large adult)   Pulse 75   Temp 97.5 °F (36.4 °C) (Temporal)   Resp 18   Ht 5' 11\" (1.803 m)   Wt 169 lb (76.7 kg)   SpO2 97%   BMI 23.57 kg/m²       Physical Exam:  General:  appears well nourished in no acute distress  Neck: no obvious deformity or masses  Lungs: comfortable on room air  Heart:  well-perfused   Lower extremity: no edema  Skin: intact    Neurological exam:  Awake, alert, oriented to person only, cannot state where he is or what month it is, responds \"I don't know\"    Able to follow 2 step commands. Attention and concentration were intact  Language was impaired with word-finding difficulties primarily with high frequency words. Able to name thumb but could not name watch or gloves. Was able to repeat. Speech: no dysarthria    Cranial nerves:   II-XII were tested    PERRRLA  BTT bilaterally   EOMI, no evidence of nystagmus  Facial sensation:  normal and symmetric  Facial motor: normal and symmetric  Hearing intact  SCM strength intact  Tongue: midline without fasciculations    Motor:    Tone normal in upper and lower extremities Strength testing:   deltoid triceps biceps Wrist ext. Wrist flex. intrinsics Hip flex. Hip ext. Knee ext. Knee flex Dorsi flex Plantar flex   Right 5 5 5 5 5 5 4 5 4 4 5 5   Left 5 5 5 5 5 5 5 5 5 5 5 5       Reflexes:    Right Left  Biceps  2 2  Triceps  2 2  Brachiorad. 2 2  Patella  2 2  Achilles 1 1    Cerebellar testing:  no tremor apparent  Uses a cane at baseline. Labs:     Lab Results   Component Value Date/Time    Hemoglobin A1c 5.8 (H) 10/12/2022 12:08 PM    Sodium 139 10/12/2022 12:08 PM    Potassium 4.8 10/12/2022 12:08 PM    Chloride 108 10/12/2022 12:08 PM    Glucose 131 (H) 10/12/2022 12:08 PM    BUN 34 (H) 10/12/2022 12:08 PM    Creatinine 1.32 (H) 10/12/2022 12:08 PM    Calcium 9.2 10/12/2022 12:08 PM    WBC 5.5 10/12/2022 12:08 PM    HCT 33.8 (L) 10/12/2022 12:08 PM    HGB 10.2 (L) 10/12/2022 12:08 PM    PLATELET 758 31/54/9576 12:08 PM       Imaging:    Results from Hospital Encounter encounter on 01/20/22    MRI LUMB SPINE WO CONT    Narrative  EXAM: MRI LUMB SPINE WO CONT    INDICATION: . Radiculopathy, lumbar region    COMPARISON: None    TECHNIQUE: MR imaging of the lumbar spine was performed using the following  sequences: sagittal T1, T2, STIR;  axial T1, T2.    CONTRAST:  None. FINDINGS:  Vertebral body heights are maintained. There is no listhesis. There is no acute marrow replacement. 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 The conus medullaris terminates at T12. Signal and caliber of the distal spinal  cord are within normal limits. Lower thoracic spine: No herniation or stenosis. L1-L2: No herniation or stenosis. L2-L3: No herniation or stenosis. L3-L4: Mild spinal stenosis. Diffuse disc bulge. L4-L5: Severe spinal stenosis. Large focal disc protrusion arising centrally and  extending, foraminal and extra foraminal. Facet arthrosis. Bilateral foraminal  stenosis. Severe disc degeneration. L5-S1: Mild to moderate spinal stenosis. Diffuse disc bulge. Facet arthrosis. Bilateral foraminal stenosis. Severe disc degeneration. Impression  Multilevel degenerative disc/facet disease as detailed, severe at  L4-5. No results found for this or any previous visit.              Patient Active Problem List   Diagnosis Code    Type 2 diabetes mellitus with chronic kidney disease ([facility] [address] E11.22    Vascular dementia with behavioral disturbance F01.518                   Signed By:   Renee Donaldson DO  Neurophysiology      November 2, 2022

## 2022-11-02 NOTE — PROGRESS NOTES
Chief Complaint   Patient presents with    New Patient     Moved her from [facility] a year ago - was in a study for Alz Disease - test revealed \" plaque build up\" - here to est care      1. Have you been to the ER, urgent care clinic since your last visit? Hospitalized since your last visit? No     2. Have you seen or consulted any other health care providers outside of the [address] [place] [address] since your last visit? Include any pap smears or colon screening.   No

## 2022-11-28 RX ORDER — CLOPIDOGREL BISULFATE 75 MG/1
75 TABLET ORAL DAILY
Qty: 90 TABLET | Refills: 3 | Status: SHIPPED | OUTPATIENT
Start: 2022-11-28

## 2022-11-28 NOTE — TELEPHONE ENCOUNTER
Per VO of Dr. Smiley Yanez: 3/3/2022    Future Appointments   Date Time Provider Denny Murphy   12/5/2022  1:20 PM FELIX Zavala BS AMB   4/14/2023 11:15 AM Inell Dakins, MD [facility] BS AMB   11/7/2023 10:40 AM Charito Guajardo DO NEUM BS AMB       Requested Prescriptions     Signed Prescriptions Disp Refills    clopidogreL (PLAVIX) 75 mg tab 90 Tablet 3     Sig: TAKE 1 TABLET BY MOUTH DAILY     Authorizing Provider: Windy Gupta     Ordering User: Ernst Burgess

## 2022-12-01 ENCOUNTER — TELEPHONE (OUTPATIENT)
Dept: CARDIOLOGY CLINIC | Age: 72
End: 2022-12-01

## 2022-12-01 NOTE — TELEPHONE ENCOUNTER
Please call this patient - he is on my schedule for Monday. Sounds like he has some dementia per Dr. Britt Phillips notes. At his last visit in March Dr. Paz Ann ordered a stress test, echo and abdominal ultrasound but none of the tests were completed. He was pre-op for cervical surgery. Please find out why testing wasn't done and offer to reschedule testing if he is agreeable. If he wants to proceed with testing then we should move his appt with me out until after the testing is done so we can go over the results.

## 2022-12-01 NOTE — TELEPHONE ENCOUNTER
Please call this patient - he is on my schedule for Monday. Sounds like he has some dementia per Dr. Maikel Azevedo notes. At his last visit in March Dr. Felicia Baumann ordered a stress test, echo and abdominal ultrasound but none of the tests were completed. He was pre-op for cervical surgery. Please find out why testing wasn't done and offer to reschedule testing if he is agreeable. If he wants to proceed with testing then we should move his appt with me out until after the testing is done so we can go over the results.

## 2022-12-01 NOTE — TELEPHONE ENCOUNTER
Per Linda Castro NP scheduled patient for nuclear stress test and abdominal ultrasound. Follow up with her after. Called and spoke with wife. Patient has dementia. 2 patient identifiers used. Rescheduled all appointments. Future Appointments   Date Time Provider Denny Murphy   12/19/2022 11:00 AM April Daigle NP CAVREY BS AMB   1/24/2023  8:00 AM VASCULAR, KAYKAY BLANC BS AMB   1/24/2023  9:00 AM NUCLEARKAYKAY BS AMB   1/26/2023  1:40 PM April Daigle NP [facility] BS AMB   4/14/2023 11:15 AM Sirena Vasquez MD [facility] BS AMB   11/7/2023 10:40 AM Charito Guajardo DO NEUM BS AMB         Reviewed nuclear testing instructions. You will be scheduled for a Nuclear Stress Test after your appointment today. Nuclear stress testing evaluates blood flow to your heart muscle and assesses cardiac function. There are 2 parts (Rest/Stress) to this procedure and will include either an exercise on a treadmill or an IV administration of a stressing medication called Lexiscan. Your cardiologist will determine which type of testing is best for you. This test can be performed in one day unless it is determined that better quality images will be obtained by performing the test over two days. *Please arrive 15 minutes prior to your appointment time    Test Duration:    -One day testing will take 4 hours    Day of testing instructions:    NO CAFFEINE (not even decaffeinated products) 24 HOURS PRIOR TO TESTING. This includes coffee, soda, tea, chocolate, multivitamins, and migraine medication, like Excedrin or Fioricet that contains caffeine. Nothing to eat or drink 4 HOURS prior to testing  NO NICOTINE 12 hours prior to testing  Advised wife she could hold metformin and bring with her along with a snack and ask nurse when patient will have permission eat in between testing.  DIABETIC PATIENTS: Take half of your insulin with a light meal 4 hours before your test.  Wear comfortable clothes and shoes (Shirts with no metal, shorts or pants, tennis shoes, no heels or flip flops)    IMPORTANT: This testing involves a cardiac tracer ordered specifically for you. If you are unable to make your appointment, please call to cancel/reschedule AT LEAST 24 hours prior to your appointment so your tracer can be cancelled. [phone]. Wife verbalized understanding and had no other questions. Provided with office number if she had any questions or concerns.

## 2022-12-05 DIAGNOSIS — I25.10 CORONARY ARTERY DISEASE INVOLVING NATIVE CORONARY ARTERY OF NATIVE HEART WITHOUT ANGINA PECTORIS: Primary | ICD-10-CM

## 2022-12-15 DIAGNOSIS — G30.9 ALZHEIMER DISEASE (HCC): ICD-10-CM

## 2022-12-15 DIAGNOSIS — F02.80 ALZHEIMER DISEASE (HCC): ICD-10-CM

## 2022-12-15 RX ORDER — LORAZEPAM 0.5 MG/1
0.5 TABLET ORAL
Qty: 90 TABLET | Refills: 2 | OUTPATIENT
Start: 2022-12-15

## 2022-12-15 NOTE — TELEPHONE ENCOUNTER
Pt's wife called for refill of:    Requested Prescriptions     Pending Prescriptions Disp Refills    LORazepam (ATIVAN) 0.5 mg tablet 90 Tablet 2     Sig: Take 1 Tablet by mouth two (2) times daily as needed for Agitation (sundowning). Max Daily Amount: 1 mg. States pt was getting medication from his trail study in [place] but now that the study is over Dr. Ángel Mir needs to start prescribing.     Please send to [facility]

## 2023-01-12 DIAGNOSIS — I10 PRIMARY HYPERTENSION: ICD-10-CM

## 2023-01-12 RX ORDER — HYDRALAZINE HYDROCHLORIDE 50 MG/1
TABLET, FILM COATED ORAL
Qty: 270 TABLET | Refills: 0 | OUTPATIENT
Start: 2023-01-12

## 2023-01-13 ENCOUNTER — TELEPHONE (OUTPATIENT)
Dept: PRIMARY CARE CLINIC | Age: 73
End: 2023-01-13

## 2023-01-13 NOTE — TELEPHONE ENCOUNTER
Called pharmacy and pharmacy noted that the medication has not been filled since October 2022. Will direct to dr. Jules Ocampo about this indication.

## 2023-01-13 NOTE — TELEPHONE ENCOUNTER
Pharmacist  said he needs clarification on the Rx Hydralazine. Pharmacist wants to know is it 25 MG or 50 MG.  Pharmacist asked for a call back [phone]

## 2023-01-24 ENCOUNTER — ANCILLARY PROCEDURE (OUTPATIENT)
Dept: CARDIOLOGY CLINIC | Age: 73
End: 2023-01-24
Payer: MEDICARE

## 2023-01-24 VITALS
BODY MASS INDEX: 23.66 KG/M2 | DIASTOLIC BLOOD PRESSURE: 98 MMHG | SYSTOLIC BLOOD PRESSURE: 180 MMHG | HEIGHT: 71 IN | WEIGHT: 169 LBS

## 2023-01-24 DIAGNOSIS — Z98.890 HISTORY OF ABDOMINAL AORTIC ANEURYSM (AAA) REPAIR: ICD-10-CM

## 2023-01-24 DIAGNOSIS — I25.10 CORONARY ARTERY DISEASE INVOLVING NATIVE CORONARY ARTERY OF NATIVE HEART WITHOUT ANGINA PECTORIS: ICD-10-CM

## 2023-01-24 PROCEDURE — A9500 TC99M SESTAMIBI: HCPCS | Performed by: SPECIALIST

## 2023-01-24 PROCEDURE — 93978 VASCULAR STUDY: CPT | Performed by: SPECIALIST

## 2023-01-24 PROCEDURE — 93017 CV STRESS TEST TRACING ONLY: CPT | Performed by: SPECIALIST

## 2023-01-24 RX ORDER — TETRAKIS(2-METHOXYISOBUTYLISOCYANIDE)COPPER(I) TETRAFLUOROBORATE 1 MG/ML
10 INJECTION, POWDER, LYOPHILIZED, FOR SOLUTION INTRAVENOUS ONCE
Status: COMPLETED | OUTPATIENT
Start: 2023-01-24 | End: 2023-01-24

## 2023-01-24 RX ORDER — TETRAKIS(2-METHOXYISOBUTYLISOCYANIDE)COPPER(I) TETRAFLUOROBORATE 1 MG/ML
30 INJECTION, POWDER, LYOPHILIZED, FOR SOLUTION INTRAVENOUS ONCE
Status: COMPLETED | OUTPATIENT
Start: 2023-01-24 | End: 2023-01-24

## 2023-01-24 RX ADMIN — REGADENOSON 0.4 MG: 0.08 INJECTION, SOLUTION INTRAVENOUS at 10:10

## 2023-01-24 RX ADMIN — TECHNETIUM TC 99M SESTAMIBI 23.1 MILLICURIE: 1 INJECTION, POWDER, FOR SOLUTION INTRAVENOUS at 10:10

## 2023-01-24 RX ADMIN — TECHNETIUM TC 99M SESTAMIBI 8.3 MILLICURIE: 1 INJECTION, POWDER, FOR SOLUTION INTRAVENOUS at 09:10

## 2023-01-25 LAB
NUC STRESS EJECTION FRACTION: 60 %
STRESS BASELINE DIAS BP: 98 MMHG
STRESS BASELINE HR: 76 BPM
STRESS BASELINE ST DEPRESSION: 0 MM
STRESS BASELINE SYS BP: 180 MMHG
STRESS O2 SAT PEAK: 96 %
STRESS O2 SAT REST: 98 %
STRESS PEAK DIAS BP: 90 MMHG
STRESS PEAK SYS BP: 160 MMHG
STRESS PERCENT HR ACHIEVED: 75 %
STRESS POST PEAK HR: 113 BPM
STRESS RATE PRESSURE PRODUCT: NORMAL BPM*MMHG
STRESS TARGET HR: 150 BPM

## 2023-01-25 PROCEDURE — 93016 CV STRESS TEST SUPVJ ONLY: CPT | Performed by: SPECIALIST

## 2023-01-25 PROCEDURE — 93018 CV STRESS TEST I&R ONLY: CPT | Performed by: SPECIALIST

## 2023-01-25 PROCEDURE — 78452 HT MUSCLE IMAGE SPECT MULT: CPT | Performed by: SPECIALIST

## 2023-01-26 ENCOUNTER — OFFICE VISIT (OUTPATIENT)
Dept: CARDIOLOGY CLINIC | Age: 73
End: 2023-01-26
Payer: MEDICARE

## 2023-01-26 VITALS
DIASTOLIC BLOOD PRESSURE: 78 MMHG | WEIGHT: 171.8 LBS | OXYGEN SATURATION: 98 % | HEIGHT: 70 IN | BODY MASS INDEX: 24.6 KG/M2 | HEART RATE: 89 BPM | SYSTOLIC BLOOD PRESSURE: 122 MMHG | RESPIRATION RATE: 18 BRPM

## 2023-01-26 DIAGNOSIS — E78.5 DYSLIPIDEMIA: ICD-10-CM

## 2023-01-26 DIAGNOSIS — I10 HTN (HYPERTENSION), BENIGN: ICD-10-CM

## 2023-01-26 DIAGNOSIS — Z98.890 HISTORY OF ABDOMINAL AORTIC ANEURYSM (AAA) REPAIR: ICD-10-CM

## 2023-01-26 DIAGNOSIS — I25.10 CORONARY ARTERY DISEASE INVOLVING NATIVE CORONARY ARTERY OF NATIVE HEART WITHOUT ANGINA PECTORIS: Primary | ICD-10-CM

## 2023-01-26 PROBLEM — I71.40 ABDOMINAL AORTIC ANEURYSM (AAA) WITHOUT RUPTURE, UNSPECIFIED PART (HCC): Status: ACTIVE | Noted: 2023-01-26

## 2023-01-26 PROCEDURE — G8420 CALC BMI NORM PARAMETERS: HCPCS | Performed by: NURSE PRACTITIONER

## 2023-01-26 PROCEDURE — 3074F SYST BP LT 130 MM HG: CPT | Performed by: NURSE PRACTITIONER

## 2023-01-26 PROCEDURE — G8536 NO DOC ELDER MAL SCRN: HCPCS | Performed by: NURSE PRACTITIONER

## 2023-01-26 PROCEDURE — 3078F DIAST BP <80 MM HG: CPT | Performed by: NURSE PRACTITIONER

## 2023-01-26 PROCEDURE — 93005 ELECTROCARDIOGRAM TRACING: CPT | Performed by: NURSE PRACTITIONER

## 2023-01-26 PROCEDURE — 99214 OFFICE O/P EST MOD 30 MIN: CPT | Performed by: NURSE PRACTITIONER

## 2023-01-26 PROCEDURE — G8427 DOCREV CUR MEDS BY ELIG CLIN: HCPCS | Performed by: NURSE PRACTITIONER

## 2023-01-26 PROCEDURE — 3017F COLORECTAL CA SCREEN DOC REV: CPT | Performed by: NURSE PRACTITIONER

## 2023-01-26 PROCEDURE — 93010 ELECTROCARDIOGRAM REPORT: CPT | Performed by: NURSE PRACTITIONER

## 2023-01-26 PROCEDURE — 1101F PT FALLS ASSESS-DOCD LE1/YR: CPT | Performed by: NURSE PRACTITIONER

## 2023-01-26 PROCEDURE — G8432 DEP SCR NOT DOC, RNG: HCPCS | Performed by: NURSE PRACTITIONER

## 2023-01-26 PROCEDURE — G0463 HOSPITAL OUTPT CLINIC VISIT: HCPCS | Performed by: NURSE PRACTITIONER

## 2023-01-26 PROCEDURE — 1123F ACP DISCUSS/DSCN MKR DOCD: CPT | Performed by: NURSE PRACTITIONER

## 2023-01-26 RX ORDER — HYDRALAZINE HYDROCHLORIDE 50 MG/1
50 TABLET, FILM COATED ORAL 3 TIMES DAILY
COMMUNITY
Start: 2022-10-24 | End: 2023-01-26 | Stop reason: SDUPTHER

## 2023-01-26 RX ORDER — HYDRALAZINE HYDROCHLORIDE 50 MG/1
50 TABLET, FILM COATED ORAL 3 TIMES DAILY
Qty: 270 TABLET | Refills: 1 | Status: SHIPPED | OUTPATIENT
Start: 2023-01-26

## 2023-01-26 NOTE — PATIENT INSTRUCTIONS
Dr. Rodrigue Miner is at [address] [place] [address] on Thursdays and Miki Metcalf NP is at [address] [place] [address] on Mondays if you want to try to coordinate visits with PCP in the future

## 2023-01-27 LAB
ABDOMINAL DIST AORTA VEL: 76 CM/S
ABDOMINAL MID AORTA VEL: 58 CM/S
ABDOMINAL PROX AORTA VEL: 58 CM/S
ABDOMINAL RT COM ILIAC AP: 1 CM
ABDOMINAL RT COM ILIAC TRANS: 1 CM
DIST AORTIC AP: 2.1 CM
DIST AORTIC TRANS: 2.1 CM
LEFT COM ILIAC PROX VEL: 110 CM/S
MID AORTIC AP: 2.3 CM
MID AORTIC TRANS: 2.4 CM
PROX AORTIC AP: 2.2 CM
PROX AORTIC TRANS: 2.4 CM
RIGHT COM ILIAC PROX VEL: 129 CM/S

## 2023-02-14 ENCOUNTER — OFFICE VISIT (OUTPATIENT)
Dept: PRIMARY CARE CLINIC | Age: 73
End: 2023-02-14
Payer: MEDICARE

## 2023-02-14 VITALS
OXYGEN SATURATION: 98 % | SYSTOLIC BLOOD PRESSURE: 108 MMHG | DIASTOLIC BLOOD PRESSURE: 68 MMHG | HEART RATE: 81 BPM | TEMPERATURE: 98.3 F | HEIGHT: 70 IN | RESPIRATION RATE: 15 BRPM | WEIGHT: 159.8 LBS | BODY MASS INDEX: 22.88 KG/M2

## 2023-02-14 DIAGNOSIS — E11.9 DM TYPE 2, GOAL HBA1C 7%-8% (HCC): ICD-10-CM

## 2023-02-14 DIAGNOSIS — I25.10 CORONARY ARTERY DISEASE INVOLVING NATIVE CORONARY ARTERY OF NATIVE HEART WITHOUT ANGINA PECTORIS: ICD-10-CM

## 2023-02-14 DIAGNOSIS — G30.9 ALZHEIMER DISEASE (HCC): ICD-10-CM

## 2023-02-14 DIAGNOSIS — F02.80 ALZHEIMER DISEASE (HCC): ICD-10-CM

## 2023-02-14 DIAGNOSIS — N18.9 CHRONIC KIDNEY DISEASE, UNSPECIFIED CKD STAGE: ICD-10-CM

## 2023-02-14 DIAGNOSIS — B35.1 TINEA UNGUIUM: ICD-10-CM

## 2023-02-14 DIAGNOSIS — I10 PRIMARY HYPERTENSION: Primary | ICD-10-CM

## 2023-02-14 DIAGNOSIS — M20.41 HAMMER TOE OF RIGHT FOOT: ICD-10-CM

## 2023-02-14 PROCEDURE — 3078F DIAST BP <80 MM HG: CPT | Performed by: INTERNAL MEDICINE

## 2023-02-14 PROCEDURE — 3017F COLORECTAL CA SCREEN DOC REV: CPT | Performed by: INTERNAL MEDICINE

## 2023-02-14 PROCEDURE — G8536 NO DOC ELDER MAL SCRN: HCPCS | Performed by: INTERNAL MEDICINE

## 2023-02-14 PROCEDURE — 99214 OFFICE O/P EST MOD 30 MIN: CPT | Performed by: INTERNAL MEDICINE

## 2023-02-14 PROCEDURE — G8427 DOCREV CUR MEDS BY ELIG CLIN: HCPCS | Performed by: INTERNAL MEDICINE

## 2023-02-14 PROCEDURE — 3046F HEMOGLOBIN A1C LEVEL >9.0%: CPT | Performed by: INTERNAL MEDICINE

## 2023-02-14 PROCEDURE — 3074F SYST BP LT 130 MM HG: CPT | Performed by: INTERNAL MEDICINE

## 2023-02-14 PROCEDURE — G8510 SCR DEP NEG, NO PLAN REQD: HCPCS | Performed by: INTERNAL MEDICINE

## 2023-02-14 PROCEDURE — 1123F ACP DISCUSS/DSCN MKR DOCD: CPT | Performed by: INTERNAL MEDICINE

## 2023-02-14 PROCEDURE — 2022F DILAT RTA XM EVC RTNOPTHY: CPT | Performed by: INTERNAL MEDICINE

## 2023-02-14 PROCEDURE — 1101F PT FALLS ASSESS-DOCD LE1/YR: CPT | Performed by: INTERNAL MEDICINE

## 2023-02-14 PROCEDURE — G8420 CALC BMI NORM PARAMETERS: HCPCS | Performed by: INTERNAL MEDICINE

## 2023-02-14 RX ORDER — CICLOPIROX 80 MG/ML
SOLUTION TOPICAL
Qty: 6.6 ML | Refills: 3 | Status: SHIPPED | OUTPATIENT
Start: 2023-02-14

## 2023-02-14 NOTE — PROGRESS NOTES
Miguel Angel Amado is a 81 y.o.  male and presents with     Chief Complaint   Patient presents with    Hypertension     Pt middle finger on left hand is starting to lift up. Possible finger fungus. One of pt toes are starting to turn downward     Pt has changes to his nail of his left middle finger. NO dizziness or light headedness  Saw cardiology - nuclear stress test was neg  Also sees Neurology for Dementia    Pt has appt with GI. Pt is taking meds as directed. No side effects    History reviewed. No pertinent past medical history. History reviewed. No pertinent surgical history. Current Outpatient Medications   Medication Sig    ciclopirox (PENLAC) 8 % solution Apply to the left middle finger toe nail  daily    hydrALAZINE (APRESOLINE) 50 mg tablet Take 1 Tablet by mouth three (3) times daily. metFORMIN ER (GLUCOPHAGE XR) 500 mg tablet TAKE 1 TABLET(500 MG) BY MOUTH DAILY WITH BREAKFAST    clopidogreL (PLAVIX) 75 mg tab TAKE 1 TABLET BY MOUTH DAILY    LORazepam (ATIVAN) 0.5 mg tablet Take 1 Tablet by mouth two (2) times daily as needed for Agitation (sundowning). Max Daily Amount: 1 mg. Namenda XR 28 mg capsule Take 1 Capsule by mouth daily. QUEtiapine (SEROquel) 25 mg tablet Take 12.5 in the AM and 12.5 mg PM    carvediloL (COREG) 6.25 mg tablet TAKE 1 TABLET BY MOUTH TWICE DAILY    dutasteride (AVODART) 0.5 mg capsule Take 0.5 mg by mouth daily. atorvastatin (LIPITOR) 40 mg tablet Take 40 mg by mouth nightly. sertraline (ZOLOFT) 100 mg tablet Take 100 mg by mouth daily. tamsulosin (FLOMAX) 0.4 mg capsule Take 0.4 mg by mouth daily. aspirin 81 mg cap Take 1 Tablet by mouth daily. multivitamin (ONE A DAY) tablet Take 1 Tablet by mouth daily. cyanocobalamin 1,000 mcg tablet Take 1,000 mcg by mouth daily. No current facility-administered medications for this visit.      Health Maintenance   Topic Date Due    Foot Exam Q1  Never done    Eye Exam Retinal or Dilated  Never done DTaP/Tdap/Td series (1 - Tdap) Never done    Colorectal Cancer Screening Combo  Never done    Shingles Vaccine (1 of 2) Never done    AAA Screening 73-67 YO Male Smoking Patients  Never done    COVID-19 Vaccine (4 - Booster for Dre series) 06/10/2022    Flu Vaccine (1) 08/01/2022    Pneumococcal 65+ years (2 - PPSV23 if available, else PCV20) 12/22/2022    Medicare Yearly Exam  04/13/2023    Diabetic Alb to Cr ratio (uACR) test  10/12/2023    GFR test (Diabetes, CKD 3-4, OR last GFR 15-59)  10/12/2023    A1C test (Diabetic or Prediabetic)  10/12/2023    Lipid Screen  10/12/2023    Depression Screen  01/26/2024    Hepatitis C Screening  Discontinued     Immunization History   Administered Date(s) Administered    COVID-19, J&J, (age 18y+), IM, 0.5mL 03/12/2021    COVID-19, PFIZER PURPLE top, DILUTE for use, (age 15 y+), IM, 30mcg/0.3mL 11/04/2021, 04/15/2022    Influenza High Dose Vaccine PF 09/30/2021    Pneumococcal Conjugate (PCV-13) 12/22/2021     No LMP for male patient. Allergies and Intolerances:   No Known Allergies    Family History:   History reviewed. No pertinent family history. Social History:   He  reports that he quit smoking about 3 years ago. His smoking use included cigarettes. He started smoking about 52 years ago. He has a 19.50 pack-year smoking history. He has quit using smokeless tobacco.  He  reports that he does not currently use alcohol.             Review of Systems:   General: negative for - chills, fatigue, fever, weight change  Psych: negative for - anxiety, depression, irritability or mood swings  ENT: negative for - headaches, hearing change, nasal congestion, oral lesions, sneezing or sore throat  Heme/ Lymph: negative for - bleeding problems, bruising, pallor or swollen lymph nodes  Endo: negative for - hot flashes, polydipsia/polyuria or temperature intolerance  Resp: negative for - cough, shortness of breath or wheezing  CV: negative for - chest pain, edema or palpitations  GI: negative for - abdominal pain, change in bowel habits, constipation, diarrhea or nausea/vomiting  : negative for - dysuria, hematuria, incontinence, pelvic pain or vulvar/vaginal symptoms  MSK: negative for - joint pain, joint swelling or muscle pain  Neuro: negative for - confusion, headaches, seizures or weakness  Derm: negative for - dry skin, hair changes, rash or skin lesion changes          Physical:   Vitals:   Vitals:    02/14/23 1327   BP: 108/68   Pulse: 81   Resp: 15   Temp: 98.3 °F (36.8 °C)   TempSrc: Oral   SpO2: 98%   Weight: 159 lb 12.8 oz (72.5 kg)   Height: 5' 10\" (1.778 m)           Exam:   HEENT- atraumatic,normocephalic, awake, oriented, well nourished  Neck - supple,no enlarged lymph nodes, no JVD, no thyromegaly  Chest- CTA, no rhonchi, no crackles  Heart- rrr, no murmurs / gallop/rub  Abdomen- soft,BS+,NT, no hepatosplenomegaly  Ext - no c/c/edema , tines unguiujm left middle finger toe nail Hammer toe rt 2nd toe    Neuro- no focal deficits. Power 5/5 all extremities  Skin - warm,dry, no obvious rashes. Review of Data:   LABS:   Lab Results   Component Value Date/Time    WBC 5.5 10/12/2022 12:08 PM    HGB 10.2 (L) 10/12/2022 12:08 PM    HCT 33.8 (L) 10/12/2022 12:08 PM    PLATELET 310 85/16/1397 12:08 PM     Lab Results   Component Value Date/Time    Sodium 139 10/12/2022 12:08 PM    Potassium 4.8 10/12/2022 12:08 PM    Chloride 108 10/12/2022 12:08 PM    CO2 26 10/12/2022 12:08 PM    Glucose 131 (H) 10/12/2022 12:08 PM    BUN 34 (H) 10/12/2022 12:08 PM    Creatinine 1.32 (H) 10/12/2022 12:08 PM     Lab Results   Component Value Date/Time    Cholesterol, total 139 10/12/2022 12:08 PM    HDL Cholesterol 55 10/12/2022 12:08 PM    LDL, calculated 60.8 10/12/2022 12:08 PM    Triglyceride 116 10/12/2022 12:08 PM     No components found for: GPT        Impression / Plan:        ICD-10-CM ICD-9-CM    1. Primary hypertension  I10 401.9       2.  Coronary artery disease involving native coronary artery of native heart without angina pectoris  I25.10 414.01       3. Alzheimer disease ([facility]  G30.9 331.0     F02.80        4. DM type 2, goal HbA1c 7%-8% ([facility])  E11.9 250.00 REFERRAL TO PODIATRY      5. Chronic kidney disease, unspecified CKD stage  N18.9 585.9       6. Tinea unguium  B35.1 110.1 ciclopirox (PENLAC) 8 % solution      7. Hammer toe of right foot  M20.41 735.4 REFERRAL TO PODIATRY      HTN/CAD /DM - stable      Handicap parking form filled    Explained to patient risk benefits of the medications. Advised patient to stop meds if having any side effects. Pt verbalized understanding of the instructions. I have discussed the diagnosis with the patient and the intended plan as seen in the above orders. The patient has received an after-visit summary and questions were answered concerning future plans. I have discussed medication side effects and warnings with the patient as well. I have reviewed the plan of care with the patient, accepted their input and they are in agreement with the treatment goals. Reviewed plan of care. Patient has provided input and agrees with goals.         Rubén Santo MD

## 2023-02-14 NOTE — PROGRESS NOTES
Verified Name and  of the patient. Chief Complaint   Patient presents with    Hypertension     Pt middle finger on left hand is starting to lift up. Possible finger fungus. One of pt toes are starting to turn downward       Health maintenance will be addressed with Primary Care Provider at next visit. Vitals:    23 1327   BP: 108/68   Pulse: 81   Resp: 15   Temp: 98.3 °F (36.8 °C)   TempSrc: Oral   SpO2: 98%   Weight: 159 lb 12.8 oz (72.5 kg)   Height: 5' 10\" (1.778 m)   PainSc:   0 - No pain       1. Have you been to the ER, urgent care clinic since your last visit? Hospitalized since your last visit? No    2. Have you seen or consulted any other health care providers outside of the [address] [place] [address] since your last visit? Include any pap smears or colon screening.  No

## 2023-02-25 RX ORDER — ATORVASTATIN CALCIUM 40 MG/1
TABLET, FILM COATED ORAL
Qty: 90 TABLET | Refills: 1 | Status: SHIPPED | OUTPATIENT
Start: 2023-02-25

## 2023-02-25 RX ORDER — ATORVASTATIN CALCIUM 40 MG/1
TABLET, FILM COATED ORAL
Qty: 90 TABLET | OUTPATIENT
Start: 2023-02-25

## 2023-04-06 ENCOUNTER — TELEPHONE (OUTPATIENT)
Dept: NEUROLOGY | Age: 73
End: 2023-04-06

## 2023-04-06 NOTE — TELEPHONE ENCOUNTER
Pt's wife, Clarissa Parra, called stating that Pt has been completely off of the lorazepam and is doing fine. He is still taking the Seroquel. She stated that Pt still occasionally still has bad bouts of sundowners and would like to know if there is anything she can give to him specifically in those moments to help him. Melvin also would like to discuss possibly having therapists or caregivers come to the house to help with exercise and also give her a break as she is the Pt's main and only caregiver. She has noticed that it is starting to wear her down. She also would like to discuss Pt's sleeping routine.  Please call [phone]

## 2023-05-23 RX ORDER — METFORMIN HYDROCHLORIDE 500 MG/1
TABLET, EXTENDED RELEASE ORAL
Qty: 90 TABLET | Refills: 0 | Status: SHIPPED | OUTPATIENT
Start: 2023-05-23

## 2023-06-06 ENCOUNTER — TELEPHONE (OUTPATIENT)
Age: 73
End: 2023-06-06

## 2023-06-06 ENCOUNTER — TELEPHONE (OUTPATIENT)
Dept: PRIMARY CARE CLINIC | Facility: CLINIC | Age: 73
End: 2023-06-06

## 2023-06-06 NOTE — TELEPHONE ENCOUNTER
Patient's wife called to see if they could get a letter from [facility] for [address] [place] [address] from [place] saying that the patient needs it. Please contact patient's wife, Daphine Snellen, at #[phone]. If possible she would like to pick the letter up on Friday.

## 2023-06-06 NOTE — TELEPHONE ENCOUNTER
Pt needs refill on seroquel 25mg. Please send to [place] at [facility]. [phone]        Pt is also in the process of trying to get home health aid.  Is requesting a statement from Dr. Christal Horton about what his health is like to help with benefits request. Please call pt for more details [phone]

## 2023-06-07 DIAGNOSIS — E11.9 TYPE 2 DIABETES MELLITUS WITHOUT COMPLICATION, UNSPECIFIED WHETHER LONG TERM INSULIN USE (HCC): ICD-10-CM

## 2023-06-07 DIAGNOSIS — G30.9 ALZHEIMER'S DISEASE, UNSPECIFIED (CODE) (HCC): Primary | ICD-10-CM

## 2023-06-07 RX ORDER — QUETIAPINE FUMARATE 25 MG/1
TABLET, FILM COATED ORAL
Qty: 60 TABLET | Refills: 1 | Status: SHIPPED | OUTPATIENT
Start: 2023-06-07

## 2023-06-07 NOTE — TELEPHONE ENCOUNTER
Spoke with patients wife, Jase Hassan, on HPI. Cory states patient is having a lot of trouble walking and his memory is declining. States he needs 24 hour care as she needs some helps. She is requesting a letter from you that states patient needs 24 hour care so she can get some home health aides in to help her. Please advise. Patient also needs a refill on his seroquel.

## 2023-06-08 ENCOUNTER — TELEPHONE (OUTPATIENT)
Dept: PRIMARY CARE CLINIC | Facility: CLINIC | Age: 73
End: 2023-06-08

## 2023-06-08 NOTE — TELEPHONE ENCOUNTER
Printed out referral with medical necessity letter. Called patients wife to update her and I will place it in the patient  folder for her.

## 2023-06-08 NOTE — TELEPHONE ENCOUNTER
----- Message from Lysbeth Sample sent at 6/7/2023  3:59 PM EDT -----  Subject: Message to Provider    QUESTIONS  Information for Provider? Patients wife is following up on a letter from   [facility] for [facility] from [place] saying that the patient needs   it. Please advise.   ---------------------------------------------------------------------------  --------------  Josemanuel Cooper INFO  [phone]; OK to leave message on voicemail  ---------------------------------------------------------------------------  --------------  SCRIPT ANSWERS  Relationship to Patient? Spouse/Partner  Representative Name? Cory Hathaway  Is the representative on the Communication Release of Information (LYLE)   form in Epic?  Yes

## 2023-06-08 NOTE — TELEPHONE ENCOUNTER
Spoke with patients wife this morning. Letter of medical necessity and home health referral is printed out and going into patient pickup. Patients wife said she will get it this afternoon or tomorrow.

## 2023-06-09 NOTE — TELEPHONE ENCOUNTER
Cory called stating that they received an extension for the letter needed so they asked that we please mail it to them.  [phone]

## 2023-06-23 ENCOUNTER — TELEPHONE (OUTPATIENT)
Dept: PRIMARY CARE CLINIC | Facility: CLINIC | Age: 73
End: 2023-06-23

## 2023-06-23 ENCOUNTER — NURSE TRIAGE (OUTPATIENT)
Dept: OTHER | Facility: CLINIC | Age: 73
End: 2023-06-23

## 2023-06-23 NOTE — TELEPHONE ENCOUNTER
[place] [facility]    Received call from [facility] at [facility] with besomebody.. Subjective: Caller states Adam Tijerina has been having trouble with his balance. It is getting worse. [place] for him to get up and down and walk. \"   Also seems like he is having joint pain sometimes    Current Symptoms:     Onset:  years ago; worsening    Associated Symptoms:     Pain Severity:   denies    Temperature:      What has been tried: family assists with mobility    LMP: NA Pregnant: NA    Recommended disposition: see in office within 2 weeks    Care advice provided, patient verbalizes understanding; denies any other questions or concerns; instructed to call back for any new or worsening symptoms. Patient/Caller agrees with recommended disposition; writer provided warm transfer to [place] at [facility] for appointment scheduling    Attention Provider: Thank you for allowing me to participate in the care of your patient. The patient was connected to triage in response to information provided to the ECC/PSC. Please do not respond through this encounter as the response is not directed to a shared pool. Reason for Disposition   Weakness is a chronic symptom (recurrent or ongoing AND present > 4 weeks)    Protocols used:  Falls and Falling-ADULT-[place]

## 2023-06-25 DIAGNOSIS — R26.9 GAIT ABNORMALITY: ICD-10-CM

## 2023-06-25 DIAGNOSIS — F02.80 DEMENTIA IN OTHER DISEASES CLASSIFIED ELSEWHERE, UNSPECIFIED SEVERITY, WITHOUT BEHAVIORAL DISTURBANCE, PSYCHOTIC DISTURBANCE, MOOD DISTURBANCE, AND ANXIETY (HCC): Primary | ICD-10-CM

## 2023-07-05 ENCOUNTER — TELEPHONE (OUTPATIENT)
Age: 73
End: 2023-07-05

## 2023-07-05 DIAGNOSIS — G30.9 ALZHEIMER'S DISEASE, UNSPECIFIED (CODE) (HCC): Primary | ICD-10-CM

## 2023-07-05 NOTE — TELEPHONE ENCOUNTER
Patient's wife, Aminah Murray, called wanting to discuss some things with Dr Brianna Duran. She thinks Pt he doesn't know who she is anymore, gets her attention in weird ways. 2. Pt's communication is bad, he cannot formulate thoughts, starts babbling and gets very frustrated. State that most times she has to guess what he is trying to tell her    3. Pt doesn't know his own name when asked     4. Pt is sleeping more. She will wake him up in am to change him and he doesn't want to stay up, wants to go back to bed. If she gets him up earlier than around 11am he refuses to get out of bed and becomes agitated. 5. Pt needs help with everything, needs guidance (Ex. If she asks him if he needs to go to the bathroom, he will say \"Anastacio, do I need to go? \")     6. Pt is able to feed himself but he seems to be eating less in the last few weeks. 2 weeks ago he started leaving food on his plate. Only will eat basic foods. 7. Stated that Pt has been taking Quetiapine doing half a tab twice a day but there have been a few times in the evening Pt seems agitated, so she will give him a whole one when these episodes happen. 8. Wants to move to a 55+ community, trying to get Pt set up with Medicaid as she is Pt's primary caregiver and she needs help. Wants to know Dr Jose Decker  thoughts on moving Pt to another place as she is concerned this could potentially upset Pt. Stated that Pt never recognizes their daughters house even though they go there often so she is unsure if moving him somewhere else would throw him off. Pt knows where he is inside the home but when they get home from somewhere, he cannot recognize the outside of their house. 9. His walking is also really bad. Pt does enjoy going to the out to the store and walking around using the cart for support. Pt has a dog as well and is attached to the dog. Please call to discuss.  [phone]

## 2023-07-05 NOTE — TELEPHONE ENCOUNTER
Spoke with patients wife, Radha Chicas, on HPI. Advised per Dr Michelle Villalpando,   This is suspicious for worsening behavioral symptoms and worsening dementia, but it has been awhile since he has had imaging of the brain, last one was done in 2021. I think we can repeat a brain MRI to make sure he didn't have any new changes, such as a stroke that may contributing to some of the items mentioned. I do think it is a great idea for him to be 55+ community where they can take better care of him. I am also happy to order home health in the meantime while they are awaiting that. I looked at his medications, they all seem appropriate, unfortunately, this is progression of the disease, but just to make sure nothing else is going on, I will order a brain MRI. Cory verified understanding. Please place order for Home Health.

## 2023-07-06 DIAGNOSIS — F01.518 VASCULAR DEMENTIA, UNSPECIFIED SEVERITY, WITH OTHER BEHAVIORAL DISTURBANCE (HCC): Primary | ICD-10-CM

## 2023-07-06 DIAGNOSIS — G30.9 ALZHEIMER'S DISEASE, UNSPECIFIED (CODE) (HCC): Primary | ICD-10-CM

## 2023-07-06 NOTE — TELEPHONE ENCOUNTER
Received message from [facility] at [facility] [address], [place]    we cannot accept. Patient need jaquelin seen first by Dr. Rg Olvera for F2F.  Last office visit was Nov 2022

## 2023-07-07 NOTE — TELEPHONE ENCOUNTER
Spoke with patients wife, Selena Ellis, on HPI. Advised that Dr Giovanni Agarwal placed an external order for [facility]. Advised I would send order to their home. Advise to then call insurance co to check what HH is in network and to then call [facility] co to scheduled appt. Cory verified understanding.

## 2023-07-17 ENCOUNTER — HOSPITAL ENCOUNTER (OUTPATIENT)
Facility: HOSPITAL | Age: 73
Setting detail: RECURRING SERIES
Discharge: HOME | End: 2023-07-20
Payer: MEDICARE

## 2023-07-17 PROCEDURE — 97161 PT EVAL LOW COMPLEX 20 MIN: CPT

## 2023-07-17 PROCEDURE — 97110 THERAPEUTIC EXERCISES: CPT

## 2023-07-17 NOTE — THERAPY EVALUATION
MEDIUM  Complexity : 1-2 comorbidities / personal factors will impact the outcome/ POC ; Examination:  LOW Complexity : 1-2 Standardized tests and measures addressing body structure, function, activity limitation and / or participation in recreation  ;Presentation:  LOW Complexity : Stable, uncomplicated  ;Clinical Decision Making:  MEDIUM Complexity : FOTO score of 26-74 Overall Complexity Rating: LOW   Problem List: decrease strength, impaired gait/balance, and decrease ADL/functional abilities   Treatment Plan may include any combination of the followin Therapeutic Exercise, 91153 Neuromuscular Re-Education, 24405 Manual Therapy, and 46843 Therapeutic Activity  Patient / Family readiness to learn indicated by: asking questions, trying to perform skills, and interest  Persons(s) to be included in education: patient (P)  Barriers to Learning/Limitations: none  Patient Self Reported Health Status: fair  Rehabilitation Potential: good    Short Term Goals: To be accomplished in 2 weeks:    1. Patient will be I in HEP to promote self management of symptoms. Long Term Goals: To be accomplished in 4-6 weeks:    1. Patient will have B LE strength > or = 4+/5 to assist with ascending and descending curbs. 2.  Patient will be able to ambulate 3000 feet with standard cane for community ambulation. Frequency / Duration: Patient to be seen 2 times per week for 4 weeks    Patient/ Caregiver education and instruction: Diagnosis, prognosis, exercises [x]  Plan of care has been reviewed with PTA      Certification Period: 23 - 10/15/23      Francisco Bose, PT       2023       3:02 PM        ===================================================================  I certify that the above Therapy Services are being furnished while the patient is under my care. I agree with the treatment plan and certify that this therapy is necessary.     Physician's Signature:_________________________   DATE:_________

## 2023-07-20 ENCOUNTER — HOSPITAL ENCOUNTER (OUTPATIENT)
Facility: HOSPITAL | Age: 73
Discharge: HOME | End: 2023-07-20
Attending: INTERNAL MEDICINE
Payer: MEDICARE

## 2023-07-20 DIAGNOSIS — G30.9 ALZHEIMER'S DISEASE, UNSPECIFIED (CODE) (HCC): ICD-10-CM

## 2023-07-20 PROCEDURE — 70551 MRI BRAIN STEM W/O DYE: CPT

## 2023-07-21 ENCOUNTER — TELEPHONE (OUTPATIENT)
Age: 73
End: 2023-07-21

## 2023-07-21 NOTE — TELEPHONE ENCOUNTER
----- Message from Brock Green DO sent at 7/21/2023 10:19 AM EDT -----  Henri Marquez, this patient does not have MyChart could you let him know that the MRI is consistent with severe Alzheimer's disease.  ----- Message -----  From: Stacy Manuel Incoming Orders Results To [facility]  Sent: 7/21/2023   9:44 AM EDT  To: Brock Green DO

## 2023-07-21 NOTE — TELEPHONE ENCOUNTER
Spoke with patients wife, Kelly Cooks, on HPI  Advised per Dr Nikolai Aj that MRI showed severe alzheimers disease. Cory questioning if any meds need to be adjusted. States she has been giving patient Seroquel 1/2 tab in am and a whole tab at night. States this has been helping. You prescribed as 1/2 tab BID.  Questioning if she should continue giving patient 1/2 tab in am and 1 tab in PM.  Please advise

## 2023-07-27 ENCOUNTER — HOSPITAL ENCOUNTER (OUTPATIENT)
Facility: HOSPITAL | Age: 73
Setting detail: RECURRING SERIES
Discharge: HOME | End: 2023-07-30
Payer: MEDICARE

## 2023-07-27 PROCEDURE — 97116 GAIT TRAINING THERAPY: CPT

## 2023-07-27 PROCEDURE — 97110 THERAPEUTIC EXERCISES: CPT

## 2023-07-27 RX ORDER — QUETIAPINE FUMARATE 25 MG/1
TABLET, FILM COATED ORAL
Qty: 60 TABLET | Refills: 1 | Status: SHIPPED | OUTPATIENT
Start: 2023-07-27

## 2023-07-27 NOTE — PROGRESS NOTES
Upgrade activities as tolerated  []  Discharge due to :  []  Other:      Jodi Smith, SHERRON       7/27/2023       2:23 PM

## 2023-07-27 NOTE — TELEPHONE ENCOUNTER
Spoke with patients wife, Keith Mejia, on HPI.advised per Dr Jeanette Rod,   Unfortunately, we do expect progression of A.D., there won't be any improvement despite being on medication. The medication will only slow things down by a total of 2 months. Seroquel is to help with hallucinations, sleep and agitation. So if it is helping that way that she is giving it, that's great she should continue to do it that way. I wouldn't change the rest of the medications at this point. He's already on maximum therapy for AD medications  Cory verified understanding and request we update the script to reflect new directions of 1/2 tab in am and 1 tab in evening.

## 2023-07-31 ENCOUNTER — APPOINTMENT (OUTPATIENT)
Facility: HOSPITAL | Age: 73
End: 2023-07-31
Payer: MEDICARE

## 2023-08-03 ENCOUNTER — HOSPITAL ENCOUNTER (OUTPATIENT)
Facility: HOSPITAL | Age: 73
Setting detail: RECURRING SERIES
Discharge: HOME | End: 2023-08-06
Payer: MEDICARE

## 2023-08-03 PROCEDURE — 97110 THERAPEUTIC EXERCISES: CPT

## 2023-08-03 NOTE — PROGRESS NOTES
PHYSICAL THERAPY - MEDICARE DAILY TREATMENT NOTE (updated 3/23)      Date: 8/3/2023          Patient Name:  Meera [place] :  1950   Medical   Diagnosis:  Unspecified abnormalities of gait and mobility [R26.9] Treatment Diagnosis:  Unspecified abnormalities of gait and mobility [R26.9]   Referral Source:  Rashaun Harris MD Insurance:   Payor: [address] [place] [address] Avenue / Plan: MEDICARE PART A AND B / Product Type: *No Product type* /                     Patient  verified yes     Visit #   Current  / Total 3 Med nec   Time   In / Out 330  pm 355 pm   Total Treatment Time 25   Total Timed Codes 25   1:1 Treatment Time 25       BC Totals Reminder:  bill using total billable   min of TIMED therapeutic procedures and modalities. 8-22 min = 1 unit; 23-37 min = 2 units; 38-52 min = 3 units; 53-67 min = 4 units; 68-82 min = 5 units            SUBJECTIVE    Pain Level (0-10 scale): 0    Any medication changes, allergies to medications, adverse drug reactions, diagnosis change, or new procedure performed?: [x] No    [] Yes (see summary sheet for update)  Medications: Verified on Patient Summary List    Subjective functional status/changes:     Patient reports that he feels good. OBJECTIVE      Therapeutic Procedures: Tx Min Billable or 1:1 Min (if diff from Tx Min) Procedure, Rationale, Specifics   25  96177 Therapeutic Exercise (timed):  increase ROM, strength, coordination, balance, and proprioception to improve patient's ability to progress to PLOF and address remaining functional goals. (see flow sheet as applicable)     Details if applicable:     0  88597 Gait Training (timed):    100 feet with SPC (assistive device) over level surfaces with CGA level of assist. Cuing for proper cane use, posture, and step clearance. To improve safety and dynamic movement with household/community ambulation.   (see flow sheet as applicable)     Details if applicable:    2 bouts of 50 feet     67641 Neuromuscular Re-Education

## 2023-08-06 DIAGNOSIS — E11.22 TYPE 2 DIABETES MELLITUS WITH CHRONIC KIDNEY DISEASE, WITHOUT LONG-TERM CURRENT USE OF INSULIN, UNSPECIFIED CKD STAGE (HCC): ICD-10-CM

## 2023-08-06 DIAGNOSIS — E78.00 PURE HYPERCHOLESTEROLEMIA, UNSPECIFIED: Primary | ICD-10-CM

## 2023-08-07 ENCOUNTER — APPOINTMENT (OUTPATIENT)
Facility: HOSPITAL | Age: 73
End: 2023-08-07
Payer: MEDICARE

## 2023-08-07 RX ORDER — ATORVASTATIN CALCIUM 40 MG/1
TABLET, FILM COATED ORAL
Qty: 90 TABLET | Refills: 0 | Status: SHIPPED | OUTPATIENT
Start: 2023-08-07

## 2023-08-07 RX ORDER — METFORMIN HYDROCHLORIDE 500 MG/1
TABLET, EXTENDED RELEASE ORAL
Qty: 90 TABLET | Refills: 0 | Status: SHIPPED | OUTPATIENT
Start: 2023-08-07

## 2023-08-10 ENCOUNTER — HOSPITAL ENCOUNTER (OUTPATIENT)
Facility: HOSPITAL | Age: 73
Setting detail: RECURRING SERIES
Discharge: HOME | End: 2023-08-13
Payer: MEDICARE

## 2023-08-10 PROCEDURE — 97110 THERAPEUTIC EXERCISES: CPT

## 2023-08-10 NOTE — PROGRESS NOTES
PHYSICAL THERAPY - MEDICARE DAILY TREATMENT NOTE (updated 3/23)      Date: 8/10/2023          Patient Name:  Jeovanny Acevedo :  1950   Medical   Diagnosis:  Unspecified abnormalities of gait and mobility [R26.9] Treatment Diagnosis:  Unspecified abnormalities of gait and mobility [R26.9]   Referral Source:  Ashanti Camilo MD Insurance:   Payor: Vishnu Roa / Plan: MEDICARE PART A AND B / Product Type: *No Product type* /                     Patient  verified yes     Visit #   Current  / Total 4 Med nec   Time   In / Out 330  pm 355 pm   Total Treatment Time 25   Total Timed Codes 25   1:1 Treatment Time 25       BC Totals Reminder:  bill using total billable   min of TIMED therapeutic procedures and modalities. 8-22 min = 1 unit; 23-37 min = 2 units; 38-52 min = 3 units; 53-67 min = 4 units; 68-82 min = 5 units            SUBJECTIVE    Pain Level (0-10 scale): 0    Any medication changes, allergies to medications, adverse drug reactions, diagnosis change, or new procedure performed?: [x] No    [] Yes (see summary sheet for update)  Medications: Verified on Patient Summary List    Subjective functional status/changes:     Patient reports no changes. Wife reports compliance with HEP is poor at home. OBJECTIVE      Therapeutic Procedures: Tx Min Billable or 1:1 Min (if diff from Tx Min) Procedure, Rationale, Specifics   25  49272 Therapeutic Exercise (timed):  increase ROM, strength, coordination, balance, and proprioception to improve patient's ability to progress to PLOF and address remaining functional goals. (see flow sheet as applicable)     Details if applicable:     0  34411 Gait Training (timed):    100 feet with SPC (assistive device) over level surfaces with CGA level of assist. Cuing for proper cane use, posture, and step clearance. To improve safety and dynamic movement with household/community ambulation.   (see flow sheet as applicable)     Details if applicable:    2 bouts of 50 feet

## 2023-08-14 ENCOUNTER — HOSPITAL ENCOUNTER (OUTPATIENT)
Facility: HOSPITAL | Age: 73
Setting detail: RECURRING SERIES
Discharge: HOME | End: 2023-08-17
Payer: MEDICARE

## 2023-08-14 PROCEDURE — 97110 THERAPEUTIC EXERCISES: CPT

## 2023-08-14 NOTE — PROGRESS NOTES
PHYSICAL THERAPY - MEDICARE DAILY TREATMENT NOTE (updated 3/23)      Date: 2023          Patient Name:  [place] Ordonez :  1950   Medical   Diagnosis:  Unspecified abnormalities of gait and mobility [R26.9] Treatment Diagnosis:  Unspecified abnormalities of gait and mobility [R26.9]   Referral Source:  Cristino Coronel MD Insurance:   Payor: Samantha Parrish / Plan: MEDICARE PART A AND B / Product Type: *No Product type* /                     Patient  verified yes     Visit #   Current  / Total 5 Med nec   Time   In / Out 340  pm 405 pm   Total Treatment Time 25   Total Timed Codes 25   1:1 Treatment Time 25      [facility] Totals Reminder:  bill using total billable   min of TIMED therapeutic procedures and modalities. 8-22 min = 1 unit; 23-37 min = 2 units; 38-52 min = 3 units; 53-67 min = 4 units; 68-82 min = 5 units            SUBJECTIVE    Pain Level (0-10 scale): 0    Any medication changes, allergies to medications, adverse drug reactions, diagnosis change, or new procedure performed?: [x] No    [] Yes (see summary sheet for update)  Medications: Verified on Patient Summary List    Subjective functional status/changes:     Patient reports no changes. Wife reports having difficulty helping patient with exercises at home. OBJECTIVE      Therapeutic Procedures: Tx Min Billable or 1:1 Min (if diff from Tx Min) Procedure, Rationale, Specifics   25  81766 Therapeutic Exercise (timed):  increase ROM, strength, coordination, balance, and proprioception to improve patient's ability to progress to PLOF and address remaining functional goals. (see flow sheet as applicable)     Details if applicable:     0  80365 Gait Training (timed):    100 feet with SPC (assistive device) over level surfaces with CGA level of assist. Cuing for proper cane use, posture, and step clearance. To improve safety and dynamic movement with household/community ambulation.   (see flow sheet as applicable)     Details if applicable:

## 2023-08-15 ENCOUNTER — HOSPITAL ENCOUNTER (OUTPATIENT)
Facility: HOSPITAL | Age: 73
Discharge: HOME | End: 2023-08-18
Payer: MEDICARE

## 2023-08-15 ENCOUNTER — OFFICE VISIT (OUTPATIENT)
Dept: PRIMARY CARE CLINIC | Facility: CLINIC | Age: 73
End: 2023-08-15
Payer: MEDICARE

## 2023-08-15 VITALS
HEIGHT: 70 IN | BODY MASS INDEX: 22.9 KG/M2 | WEIGHT: 160 LBS | DIASTOLIC BLOOD PRESSURE: 50 MMHG | HEART RATE: 92 BPM | RESPIRATION RATE: 18 BRPM | SYSTOLIC BLOOD PRESSURE: 83 MMHG | TEMPERATURE: 97.8 F | OXYGEN SATURATION: 96 %

## 2023-08-15 DIAGNOSIS — R63.4 WEIGHT LOSS: Primary | ICD-10-CM

## 2023-08-15 DIAGNOSIS — R63.4 WEIGHT LOSS: ICD-10-CM

## 2023-08-15 DIAGNOSIS — E11.22 TYPE 2 DIABETES MELLITUS WITH CHRONIC KIDNEY DISEASE, WITHOUT LONG-TERM CURRENT USE OF INSULIN, UNSPECIFIED CKD STAGE (HCC): ICD-10-CM

## 2023-08-15 DIAGNOSIS — I10 ESSENTIAL (PRIMARY) HYPERTENSION: ICD-10-CM

## 2023-08-15 DIAGNOSIS — I95.9 HYPOTENSION, UNSPECIFIED HYPOTENSION TYPE: ICD-10-CM

## 2023-08-15 DIAGNOSIS — E78.00 PURE HYPERCHOLESTEROLEMIA, UNSPECIFIED: ICD-10-CM

## 2023-08-15 DIAGNOSIS — Z00.00 MEDICARE ANNUAL WELLNESS VISIT, SUBSEQUENT: ICD-10-CM

## 2023-08-15 DIAGNOSIS — R53.83 OTHER FATIGUE: ICD-10-CM

## 2023-08-15 DIAGNOSIS — Z01.84 IMMUNITY STATUS TESTING: ICD-10-CM

## 2023-08-15 DIAGNOSIS — N18.9 CHRONIC KIDNEY DISEASE, UNSPECIFIED CKD STAGE: ICD-10-CM

## 2023-08-15 DIAGNOSIS — G30.9 ALZHEIMER'S DISEASE, UNSPECIFIED (CODE) (HCC): ICD-10-CM

## 2023-08-15 PROCEDURE — 1123F ACP DISCUSS/DSCN MKR DOCD: CPT | Performed by: INTERNAL MEDICINE

## 2023-08-15 PROCEDURE — 3046F HEMOGLOBIN A1C LEVEL >9.0%: CPT | Performed by: INTERNAL MEDICINE

## 2023-08-15 PROCEDURE — 3078F DIAST BP <80 MM HG: CPT | Performed by: INTERNAL MEDICINE

## 2023-08-15 PROCEDURE — G0439 PPPS, SUBSEQ VISIT: HCPCS | Performed by: INTERNAL MEDICINE

## 2023-08-15 PROCEDURE — 3017F COLORECTAL CA SCREEN DOC REV: CPT | Performed by: INTERNAL MEDICINE

## 2023-08-15 PROCEDURE — 3074F SYST BP LT 130 MM HG: CPT | Performed by: INTERNAL MEDICINE

## 2023-08-15 PROCEDURE — 71046 X-RAY EXAM CHEST 2 VIEWS: CPT

## 2023-08-15 SDOH — ECONOMIC STABILITY: FOOD INSECURITY: WITHIN THE PAST 12 MONTHS, YOU WORRIED THAT YOUR FOOD WOULD RUN OUT BEFORE YOU GOT MONEY TO BUY MORE.: PATIENT DECLINED

## 2023-08-15 SDOH — ECONOMIC STABILITY: HOUSING INSECURITY
IN THE LAST 12 MONTHS, WAS THERE A TIME WHEN YOU DID NOT HAVE A STEADY PLACE TO SLEEP OR SLEPT IN A SHELTER (INCLUDING NOW)?: PATIENT REFUSED

## 2023-08-15 SDOH — ECONOMIC STABILITY: INCOME INSECURITY: HOW HARD IS IT FOR YOU TO PAY FOR THE VERY BASICS LIKE FOOD, HOUSING, MEDICAL CARE, AND HEATING?: PATIENT DECLINED

## 2023-08-15 SDOH — ECONOMIC STABILITY: FOOD INSECURITY: WITHIN THE PAST 12 MONTHS, THE FOOD YOU BOUGHT JUST DIDN'T LAST AND YOU DIDN'T HAVE MONEY TO GET MORE.: PATIENT DECLINED

## 2023-08-15 ASSESSMENT — PATIENT HEALTH QUESTIONNAIRE - PHQ9
SUM OF ALL RESPONSES TO PHQ QUESTIONS 1-9: 0
SUM OF ALL RESPONSES TO PHQ9 QUESTIONS 1 & 2: 0
2. FEELING DOWN, DEPRESSED OR HOPELESS: 0
1. LITTLE INTEREST OR PLEASURE IN DOING THINGS: 0
SUM OF ALL RESPONSES TO PHQ QUESTIONS 1-9: 0

## 2023-08-15 ASSESSMENT — LIFESTYLE VARIABLES
HOW MANY STANDARD DRINKS CONTAINING ALCOHOL DO YOU HAVE ON A TYPICAL DAY: PATIENT DOES NOT DRINK
HOW OFTEN DO YOU HAVE A DRINK CONTAINING ALCOHOL: NEVER

## 2023-08-16 LAB
ALBUMIN SERPL-MCNC: 3.7 G/DL (ref 3.5–5)
ALBUMIN/GLOB SERPL: 1.1 (ref 1.1–2.2)
ALP SERPL-CCNC: 85 U/L (ref 45–117)
ALT SERPL-CCNC: 36 U/L (ref 12–78)
ANION GAP SERPL CALC-SCNC: 5 MMOL/L (ref 5–15)
AST SERPL-CCNC: 31 U/L (ref 15–37)
BILIRUB SERPL-MCNC: 0.5 MG/DL (ref 0.2–1)
BUN SERPL-MCNC: 30 MG/DL (ref 6–20)
BUN/CREAT SERPL: 21 (ref 12–20)
CALCIUM SERPL-MCNC: 9.5 MG/DL (ref 8.5–10.1)
CHLORIDE SERPL-SCNC: 108 MMOL/L (ref 97–108)
CHOLEST SERPL-MCNC: 143 MG/DL
CO2 SERPL-SCNC: 26 MMOL/L (ref 21–32)
CORTIS AM PEAK SERPL-MCNC: 16.9 UG/DL (ref 4.3–22.45)
CREAT SERPL-MCNC: 1.41 MG/DL (ref 0.7–1.3)
ERYTHROCYTE [DISTWIDTH] IN BLOOD BY AUTOMATED COUNT: 15.5 % (ref 11.5–14.5)
EST. AVERAGE GLUCOSE BLD GHB EST-MCNC: 117 MG/DL
GLOBULIN SER CALC-MCNC: 3.4 G/DL (ref 2–4)
GLUCOSE SERPL-MCNC: 145 MG/DL (ref 65–100)
HBA1C MFR BLD: 5.7 % (ref 4–5.6)
HCT VFR BLD AUTO: 35.5 % (ref 36.6–50.3)
HDLC SERPL-MCNC: 44 MG/DL
HDLC SERPL: 3.3 (ref 0–5)
HGB BLD-MCNC: 10.7 G/DL (ref 12.1–17)
LDLC SERPL CALC-MCNC: 58.8 MG/DL (ref 0–100)
MCH RBC QN AUTO: 24.8 PG (ref 26–34)
MCHC RBC AUTO-ENTMCNC: 30.1 G/DL (ref 30–36.5)
MCV RBC AUTO: 82.4 FL (ref 80–99)
NRBC # BLD: 0 K/UL (ref 0–0.01)
NRBC BLD-RTO: 0 PER 100 WBC
PLATELET # BLD AUTO: 194 K/UL (ref 150–400)
PMV BLD AUTO: 11 FL (ref 8.9–12.9)
POTASSIUM SERPL-SCNC: 4 MMOL/L (ref 3.5–5.1)
PROT SERPL-MCNC: 7.1 G/DL (ref 6.4–8.2)
RBC # BLD AUTO: 4.31 M/UL (ref 4.1–5.7)
SODIUM SERPL-SCNC: 139 MMOL/L (ref 136–145)
T4 FREE SERPL-MCNC: 1 NG/DL (ref 0.8–1.5)
TRIGL SERPL-MCNC: 201 MG/DL
TSH SERPL DL<=0.05 MIU/L-ACNC: 2.31 UIU/ML (ref 0.36–3.74)
VLDLC SERPL CALC-MCNC: 40.2 MG/DL
WBC # BLD AUTO: 6.2 K/UL (ref 4.1–11.1)

## 2023-08-17 ENCOUNTER — HOSPITAL ENCOUNTER (OUTPATIENT)
Facility: HOSPITAL | Age: 73
Setting detail: RECURRING SERIES
Discharge: HOME | End: 2023-08-20
Payer: MEDICARE

## 2023-08-17 LAB — VZV IGG SER IA-ACNC: 445 INDEX

## 2023-08-17 PROCEDURE — 97110 THERAPEUTIC EXERCISES: CPT

## 2023-08-17 NOTE — PROGRESS NOTES
PHYSICAL THERAPY - MEDICARE DAILY TREATMENT NOTE (updated 3/23)      Date: 2023          Patient Name:  Carrie Hogan :  1950   Medical   Diagnosis:  Unspecified abnormalities of gait and mobility [R26.9] Treatment Diagnosis:  Unspecified abnormalities of gait and mobility [R26.9]   Referral Source:  Dima Palacios MD Insurance:   Payor: Pastora Luke / Plan: MEDICARE PART A AND B / Product Type: *No Product type* /                     Patient  verified yes     Visit #   Current  / Total 6 Med nec   Time   In / Out 330  pm 455 pm   Total Treatment Time 25   Total Timed Codes 25   1:1 Treatment Time 25      [facility] Totals Reminder:  bill using total billable   min of TIMED therapeutic procedures and modalities. 8-22 min = 1 unit; 23-37 min = 2 units; 38-52 min = 3 units; 53-67 min = 4 units; 68-82 min = 5 units            SUBJECTIVE    Pain Level (0-10 scale): 0    Any medication changes, allergies to medications, adverse drug reactions, diagnosis change, or new procedure performed?: [x] No    [] Yes (see summary sheet for update)  Medications: Verified on Patient Summary List    Subjective functional status/changes:     Patient reports no changes. OBJECTIVE      Therapeutic Procedures: Tx Min Billable or 1:1 Min (if diff from Tx Min) Procedure, Rationale, Specifics   25  78997 Therapeutic Exercise (timed):  increase ROM, strength, coordination, balance, and proprioception to improve patient's ability to progress to PLOF and address remaining functional goals. (see flow sheet as applicable)     Details if applicable:     0  21014 Gait Training (timed):    100 feet with SPC (assistive device) over level surfaces with CGA level of assist. Cuing for proper cane use, posture, and step clearance. To improve safety and dynamic movement with household/community ambulation.   (see flow sheet as applicable)     Details if applicable:    2 bouts of 50 feet     80573 Neuromuscular Re-Education (timed):

## 2023-08-21 ENCOUNTER — HOSPITAL ENCOUNTER (OUTPATIENT)
Facility: HOSPITAL | Age: 73
Setting detail: RECURRING SERIES
Discharge: HOME | End: 2023-08-24
Payer: MEDICARE

## 2023-08-21 PROCEDURE — 97110 THERAPEUTIC EXERCISES: CPT

## 2023-08-21 NOTE — PROGRESS NOTES
PHYSICAL THERAPY - MEDICARE DAILY TREATMENT NOTE Jw Alicia NOTE(updated 3/23)      Date: 2023          Patient Name:  Carolin Nguyễn :  1950   Medical   Diagnosis:  Unspecified abnormalities of gait and mobility [R26.9] Treatment Diagnosis:  Unspecified abnormalities of gait and mobility [R26.9]   Referral Source:  Juan Francisco Mi MD Insurance:   Payor: Caryle Curia / Plan: MEDICARE PART A AND B / Product Type: *No Product type* /                     Patient  verified yes     Visit #   Current  / Total 7 Med nec   Time   In / Out 100 pm 130 pm   Total Treatment Time 30   Total Timed Codes 30   1:1 Treatment Time 30      [facility] BC Totals Reminder:  bill using total billable   min of TIMED therapeutic procedures and modalities. 8-22 min = 1 unit; 23-37 min = 2 units; 38-52 min = 3 units; 53-67 min = 4 units; 68-82 min = 5 units            SUBJECTIVE    Pain Level (0-10 scale): 0    Any medication changes, allergies to medications, adverse drug reactions, diagnosis change, or new procedure performed?: [x] No    [] Yes (see summary sheet for update)  Medications: Verified on Patient Summary List    Subjective functional status/changes:     Patient reports no changes. OBJECTIVE     Observation:                  Sit to stand tranfers: requires multiple attempts and use of B Ues               5x sit to stand 30 sec      Balance: NBOS EO 15 sec min to mod sway, NBOS EC 5 sec, semi tandem stance 15 sec      Gait: Ambulates with standard cane, genu recurvatum         Strength:      R Hip flexion <3/5  R Knee extension <3/5  R Ankle DF 4/5     L Hip flexion 4/+5  L Knee extension 4+/5  L Ankle DF 4/5    Therapeutic Procedures: Tx Min Billable or 1:1 Min (if diff from Tx Min) Procedure, Rationale, Specifics   30  04245 Therapeutic Exercise (timed):  increase ROM, strength, coordination, balance, and proprioception to improve patient's ability to progress to PLOF and address remaining functional goals.  (see flow

## 2023-08-23 ENCOUNTER — OFFICE VISIT (OUTPATIENT)
Dept: PRIMARY CARE CLINIC | Facility: CLINIC | Age: 73
End: 2023-08-23
Payer: MEDICARE

## 2023-08-23 VITALS
SYSTOLIC BLOOD PRESSURE: 112 MMHG | OXYGEN SATURATION: 97 % | RESPIRATION RATE: 18 BRPM | TEMPERATURE: 97.8 F | WEIGHT: 160 LBS | BODY MASS INDEX: 22.9 KG/M2 | DIASTOLIC BLOOD PRESSURE: 73 MMHG | HEIGHT: 70 IN | HEART RATE: 118 BPM

## 2023-08-23 DIAGNOSIS — I10 PRIMARY HYPERTENSION: ICD-10-CM

## 2023-08-23 DIAGNOSIS — I25.10 ATHEROSCLEROSIS OF NATIVE CORONARY ARTERY OF NATIVE HEART WITHOUT ANGINA PECTORIS: ICD-10-CM

## 2023-08-23 DIAGNOSIS — G30.9 ALZHEIMER'S DISEASE, UNSPECIFIED (CODE) (HCC): ICD-10-CM

## 2023-08-23 DIAGNOSIS — R29.898 WEAKNESS OF BOTH LOWER EXTREMITIES: ICD-10-CM

## 2023-08-23 DIAGNOSIS — N18.9 CHRONIC KIDNEY DISEASE, UNSPECIFIED CKD STAGE: ICD-10-CM

## 2023-08-23 DIAGNOSIS — E11.22 TYPE 2 DIABETES MELLITUS WITH CHRONIC KIDNEY DISEASE, WITHOUT LONG-TERM CURRENT USE OF INSULIN, UNSPECIFIED CKD STAGE (HCC): Primary | ICD-10-CM

## 2023-08-23 PROCEDURE — 3044F HG A1C LEVEL LT 7.0%: CPT | Performed by: INTERNAL MEDICINE

## 2023-08-23 PROCEDURE — G8420 CALC BMI NORM PARAMETERS: HCPCS | Performed by: INTERNAL MEDICINE

## 2023-08-23 PROCEDURE — 1123F ACP DISCUSS/DSCN MKR DOCD: CPT | Performed by: INTERNAL MEDICINE

## 2023-08-23 PROCEDURE — 4004F PT TOBACCO SCREEN RCVD TLK: CPT | Performed by: INTERNAL MEDICINE

## 2023-08-23 PROCEDURE — G8427 DOCREV CUR MEDS BY ELIG CLIN: HCPCS | Performed by: INTERNAL MEDICINE

## 2023-08-23 PROCEDURE — 99213 OFFICE O/P EST LOW 20 MIN: CPT | Performed by: INTERNAL MEDICINE

## 2023-08-23 PROCEDURE — 3017F COLORECTAL CA SCREEN DOC REV: CPT | Performed by: INTERNAL MEDICINE

## 2023-08-23 PROCEDURE — 3074F SYST BP LT 130 MM HG: CPT | Performed by: INTERNAL MEDICINE

## 2023-08-23 PROCEDURE — 3078F DIAST BP <80 MM HG: CPT | Performed by: INTERNAL MEDICINE

## 2023-08-23 PROCEDURE — 2022F DILAT RTA XM EVC RTNOPTHY: CPT | Performed by: INTERNAL MEDICINE

## 2023-08-23 RX ORDER — HYDRALAZINE HYDROCHLORIDE 25 MG/1
25 TABLET, FILM COATED ORAL 3 TIMES DAILY
Qty: 270 TABLET | Refills: 1 | Status: SHIPPED | OUTPATIENT
Start: 2023-08-23

## 2023-08-23 RX ORDER — CARVEDILOL 3.12 MG/1
3.12 TABLET ORAL 2 TIMES DAILY
Qty: 180 TABLET | Refills: 1 | Status: SHIPPED | OUTPATIENT
Start: 2023-08-23

## 2023-08-24 ENCOUNTER — HOSPITAL ENCOUNTER (OUTPATIENT)
Facility: HOSPITAL | Age: 73
Setting detail: RECURRING SERIES
Discharge: HOME | End: 2023-08-27
Payer: MEDICARE

## 2023-08-24 PROCEDURE — 97110 THERAPEUTIC EXERCISES: CPT

## 2023-08-24 NOTE — PROGRESS NOTES
PHYSICAL THERAPY - MEDICARE DAILY TREATMENT NOTE (updated 3/23)      Date: 2023          Patient Name:  Harriet Florentino :  1950   Medical   Diagnosis:  Unspecified abnormalities of gait and mobility [R26.9] Treatment Diagnosis:  Unspecified abnormalities of gait and mobility [R26.9]   Referral Source:  Eulalio Butcher MD Insurance:   Payor: [facility] / Plan: MEDICARE PART A AND B / Product Type: *No Product type* /                     Patient  verified yes     Visit #   Current  / Total 8 Med nec   Time   In / Out 2:30 pm 2:55 pm   Total Treatment Time 25   Total Timed Codes 25   1:1 Treatment Time 25      [facility] BC Totals Reminder:  bill using total billable   min of TIMED therapeutic procedures and modalities. 8-22 min = 1 unit; 23-37 min = 2 units; 38-52 min = 3 units; 53-67 min = 4 units; 68-82 min = 5 units            SUBJECTIVE    Pain Level (0-10 scale): 0    Any medication changes, allergies to medications, adverse drug reactions, diagnosis change, or new procedure performed?: [x] No    [] Yes (see summary sheet for update)  Medications: Verified on Patient Summary List    Subjective functional status/changes:     Patient reports no changes. OBJECTIVE       Therapeutic Procedures: Tx Min Billable or 1:1 Min (if diff from Tx Min) Procedure, Rationale, Specifics   25  46646 Therapeutic Exercise (timed):  increase ROM, strength, coordination, balance, and proprioception to improve patient's ability to progress to PLOF and address remaining functional goals. (see flow sheet as applicable)     Details if applicable:     0  62081 Gait Training (timed):    100 feet with SPC (assistive device) over level surfaces with CGA level of assist. Cuing for proper cane use, posture, and step clearance. To improve safety and dynamic movement with household/community ambulation.   (see flow sheet as applicable)     Details if applicable:    2 bouts of 50 feet     14853 Neuromuscular Re-Education (timed):

## 2023-08-31 ENCOUNTER — HOSPITAL ENCOUNTER (OUTPATIENT)
Facility: HOSPITAL | Age: 73
Setting detail: RECURRING SERIES
End: 2023-08-31
Payer: MEDICARE

## 2023-09-06 ENCOUNTER — APPOINTMENT (OUTPATIENT)
Facility: HOSPITAL | Age: 73
End: 2023-09-06
Payer: MEDICARE

## 2023-09-11 ENCOUNTER — APPOINTMENT (OUTPATIENT)
Facility: HOSPITAL | Age: 73
End: 2023-09-11
Payer: MEDICARE

## 2023-09-14 ENCOUNTER — HOSPITAL ENCOUNTER (OUTPATIENT)
Facility: HOSPITAL | Age: 73
Setting detail: RECURRING SERIES
Discharge: HOME | End: 2023-09-17
Payer: MEDICARE

## 2023-09-14 PROCEDURE — 97110 THERAPEUTIC EXERCISES: CPT

## 2023-09-14 NOTE — PROGRESS NOTES
PHYSICAL THERAPY - MEDICARE DAILY TREATMENT NOTE Filiberto Hanks NOTE(updated 3/23)      Date: 2023          Patient Name:  Yasmine Kumar :  1950   Medical   Diagnosis:  Unspecified abnormalities of gait and mobility [R26.9] Treatment Diagnosis:  Unspecified abnormalities of gait and mobility [R26.9]   Referral Source:  Everardo Dawkins MD Insurance:   Payor: Santa Stuart / Plan: MEDICARE PART A AND B / Product Type: *No Product type* /                     Patient  verified yes     Visit #   Current  / Total 9 Med nec   Time   In / Out 330 pm 400 pm   Total Treatment Time 30   Total Timed Codes 30   1:1 Treatment Time 30      [facility] Totals Reminder:  bill using total billable   min of TIMED therapeutic procedures and modalities. 8-22 min = 1 unit; 23-37 min = 2 units; 38-52 min = 3 units; 53-67 min = 4 units; 68-82 min = 5 units            SUBJECTIVE    Pain Level (0-10 scale): 0    Any medication changes, allergies to medications, adverse drug reactions, diagnosis change, or new procedure performed?: [x] No    [] Yes (see summary sheet for update)  Medications: Verified on Patient Summary List    Subjective functional status/changes:     Patient unable to report any changes. Patient's wife reports patient has not been compliant with HEP at home. She has been able to get him to take walks but not able to get him to complete his HEP. OBJECTIVE     Observation:                  Sit to stand tranfers: requires multiple attempts and use of B Ues               5x sit to stand 36 sec      Balance: NBOS EO 15 sec min to mod sway, NBOS EC 6 sec, semi tandem stance 15 sec      Gait: Ambulates with standard cane, genu recurvatum R        Strength:      R Hip flexion <3/5  R Knee extension <3/5  R Ankle DF 4/5     L Hip flexion 4/+5  L Knee extension 4+/5  L Ankle DF 4/5    Therapeutic Procedures:   Tx Min Billable or 1:1 Min (if diff from Tx Min) Procedure, Rationale, Specifics   30  61106 Therapeutic Exercise

## 2023-09-18 ENCOUNTER — HOSPITAL ENCOUNTER (OUTPATIENT)
Facility: HOSPITAL | Age: 73
Setting detail: RECURRING SERIES
Discharge: HOME | End: 2023-09-21
Payer: MEDICARE

## 2023-09-18 PROCEDURE — 97110 THERAPEUTIC EXERCISES: CPT

## 2023-09-18 NOTE — PROGRESS NOTES
PHYSICAL THERAPY - MEDICARE DAILY TREATMENT NOTE Andrew Birch NOTE(updated 3/23)      Date: 2023          Patient Name:  Marleny Mar :  1950   Medical   Diagnosis:  Unspecified abnormalities of gait and mobility [R26.9] Treatment Diagnosis:  Unspecified abnormalities of gait and mobility [R26.9]   Referral Source:  Tito Jacob MD Insurance:   Payor: Parish Jatin / Plan: MEDICARE PART A AND B / Product Type: *No Product type* /                     Patient  verified yes     Visit #   Current  / Total 10 Med nec   Time   In / Out 300 pm 330 pm   Total Treatment Time 30   Total Timed Codes 30   1:1 Treatment Time 30      [facility] Totals Reminder:  bill using total billable   min of TIMED therapeutic procedures and modalities. 8-22 min = 1 unit; 23-37 min = 2 units; 38-52 min = 3 units; 53-67 min = 4 units; 68-82 min = 5 units            SUBJECTIVE    Pain Level (0-10 scale): 0    Any medication changes, allergies to medications, adverse drug reactions, diagnosis change, or new procedure performed?: [x] No    [] Yes (see summary sheet for update)  Medications: Verified on Patient Summary List    Subjective functional status/changes:     Patient's wife reports she is trying to walk with him as much as she can. OBJECTIVE         Therapeutic Procedures: Tx Min Billable or 1:1 Min (if diff from Tx Min) Procedure, Rationale, Specifics   30  76525 Therapeutic Exercise (timed):  increase ROM, strength, coordination, balance, and proprioception to improve patient's ability to progress to PLOF and address remaining functional goals. (see flow sheet as applicable)     Details if applicable:     0  89008 Gait Training (timed):    100 feet with SPC (assistive device) over level surfaces with CGA level of assist. Cuing for proper cane use, posture, and step clearance. To improve safety and dynamic movement with household/community ambulation.   (see flow sheet as applicable)     Details if applicable:    2 bouts

## 2023-09-20 ENCOUNTER — HOSPITAL ENCOUNTER (OUTPATIENT)
Facility: HOSPITAL | Age: 73
Setting detail: RECURRING SERIES
Discharge: HOME | End: 2023-09-23
Payer: MEDICARE

## 2023-09-20 PROCEDURE — 97110 THERAPEUTIC EXERCISES: CPT

## 2023-09-20 NOTE — PROGRESS NOTES
community ambulation.  PROGRESSING      PLAN  Yes  Continue plan of care 2x week x 2 weeks from 8/38/94   Re-Cert Due: 80/08/85  []  Upgrade activities as tolerated  []  Discharge due to :  []  Other:      Delfina Sierra, SHERRON       9/20/2023       3:32 PM

## 2023-09-25 ENCOUNTER — HOSPITAL ENCOUNTER (OUTPATIENT)
Facility: HOSPITAL | Age: 73
Setting detail: RECURRING SERIES
Discharge: HOME | End: 2023-09-28
Payer: MEDICARE

## 2023-09-25 PROCEDURE — 97110 THERAPEUTIC EXERCISES: CPT

## 2023-09-25 NOTE — PROGRESS NOTES
be able to ambulate 3000 feet with standard cane for community ambulation.  PROGRESSING      PLAN  Yes  Continue plan of care 2x week x 2 weeks from 4/00/35   Re-Cert Due: 25/43/13  []  Upgrade activities as tolerated  []  Discharge due to :  []  Other:      Leroy Gonzalez PTA       9/25/2023       6:57 PM

## 2023-09-28 ENCOUNTER — APPOINTMENT (OUTPATIENT)
Facility: HOSPITAL | Age: 73
End: 2023-09-28
Payer: MEDICARE

## 2023-10-04 LAB
ESTIMATED AVERAGE GLUCOSE: NORMAL
HBA1C MFR BLD: 5.9 %

## 2023-10-05 RX ORDER — CARVEDILOL 6.25 MG/1
6.25 TABLET ORAL 2 TIMES DAILY
Qty: 180 TABLET | OUTPATIENT
Start: 2023-10-05

## 2023-10-05 RX ORDER — HYDRALAZINE HYDROCHLORIDE 50 MG/1
50 TABLET, FILM COATED ORAL 3 TIMES DAILY
Qty: 270 TABLET | OUTPATIENT
Start: 2023-10-05

## 2023-11-10 ENCOUNTER — OFFICE VISIT (OUTPATIENT)
Age: 73
End: 2023-11-10
Payer: MEDICARE

## 2023-11-10 VITALS
WEIGHT: 166 LBS | OXYGEN SATURATION: 99 % | HEART RATE: 77 BPM | DIASTOLIC BLOOD PRESSURE: 78 MMHG | SYSTOLIC BLOOD PRESSURE: 132 MMHG | BODY MASS INDEX: 23.77 KG/M2 | HEIGHT: 70 IN

## 2023-11-10 DIAGNOSIS — I10 ESSENTIAL (PRIMARY) HYPERTENSION: Primary | ICD-10-CM

## 2023-11-10 PROCEDURE — 93010 ELECTROCARDIOGRAM REPORT: CPT | Performed by: SPECIALIST

## 2023-11-10 PROCEDURE — 3017F COLORECTAL CA SCREEN DOC REV: CPT | Performed by: SPECIALIST

## 2023-11-10 PROCEDURE — G8484 FLU IMMUNIZE NO ADMIN: HCPCS | Performed by: SPECIALIST

## 2023-11-10 PROCEDURE — G8420 CALC BMI NORM PARAMETERS: HCPCS | Performed by: SPECIALIST

## 2023-11-10 PROCEDURE — 99214 OFFICE O/P EST MOD 30 MIN: CPT | Performed by: SPECIALIST

## 2023-11-10 PROCEDURE — 1036F TOBACCO NON-USER: CPT | Performed by: SPECIALIST

## 2023-11-10 PROCEDURE — 93005 ELECTROCARDIOGRAM TRACING: CPT | Performed by: SPECIALIST

## 2023-11-10 PROCEDURE — 1123F ACP DISCUSS/DSCN MKR DOCD: CPT | Performed by: SPECIALIST

## 2023-11-10 PROCEDURE — G8427 DOCREV CUR MEDS BY ELIG CLIN: HCPCS | Performed by: SPECIALIST

## 2023-11-10 PROCEDURE — 3075F SYST BP GE 130 - 139MM HG: CPT | Performed by: SPECIALIST

## 2023-11-10 PROCEDURE — 3078F DIAST BP <80 MM HG: CPT | Performed by: SPECIALIST

## 2023-11-10 NOTE — PROGRESS NOTES
[address] [facility]                                                            OFFICE NOTE        Phan Woodard M.D.,CLIF BROOKE ARRIETA   1950  [id number]    Date/Time:  11/10/83463:08 PM            SUBJECTIVE:      Seemingly asymptomatic course is significant memory impairment as of concern. But this point there is no reports of chest pain or shortness of breath or palpitations. Assessment/Plan        1. CAD: Status post MI in 2018 status post PCI of the circumflex in 2018 in [place]. At the time the cardiac catheterization revealed an ejection fraction of 50% 20% left main stenosis 70% distal LAD and 50% mid RCA stenosis and 40% of PDA. Once again the circumflex completely occluded at that time and he underwent a PCI with a 3.0 x 16 mm Synergy stent. Continue aspirin Plavix for now. Continue Lipitor and Coreg. Discussed results of nuclear stress test which 2023 at this time we will continue medical therapy only. 3.  PAD: Patient with distal thoracic aortic and abdominal aneurysm status post stenting in 2018. + abd us 1/ 2023 with no evidence of abdominal aortic dilatation maximum diameter was 2.4 cm. Not visualize stents     4. Memory impairment: Significant. 4.  Hypertension: Controlled    5. Hyperlipidemia: Continue Lipitor. Closely followed by his primary care physician. LDL goal less than 70. EKG is unchanged with a normal sinus rhythm incomplete right bundle branch block nonspecific ST-T changes. Otherwise see him back in 6 months. HPI     Patient is a 77-year-old male  relocated from [place] with a past medical history remarkable for hypertension, hyperlipidemia, coronary disease, aortic disease, and memory impairment presents today for routine follow-up.      Status post MI in 2018 status post PCI of the circumflex in 2018 in

## 2023-11-19 ENCOUNTER — APPOINTMENT (OUTPATIENT)
Facility: HOSPITAL | Age: 73
DRG: 177 | End: 2023-11-19
Payer: MEDICARE

## 2023-11-19 ENCOUNTER — HOSPITAL ENCOUNTER (INPATIENT)
Facility: HOSPITAL | Age: 73
LOS: 2 days | Discharge: NURSING, REHAB OR LONG-TERM CARE | DRG: 177 | End: 2023-11-21
Attending: STUDENT IN AN ORGANIZED HEALTH CARE EDUCATION/TRAINING PROGRAM | Admitting: FAMILY MEDICINE
Payer: MEDICARE

## 2023-11-19 ENCOUNTER — HOSPITAL ENCOUNTER (EMERGENCY)
Facility: HOSPITAL | Age: 73
Discharge: HOME | DRG: 177 | End: 2023-11-22
Payer: MEDICARE

## 2023-11-19 DIAGNOSIS — R41.0 DELIRIUM: ICD-10-CM

## 2023-11-19 DIAGNOSIS — U07.1 COVID-19: Primary | ICD-10-CM

## 2023-11-19 DIAGNOSIS — R62.7 FAILURE TO THRIVE IN ADULT: ICD-10-CM

## 2023-11-19 PROBLEM — J12.82 PNEUMONIA DUE TO COVID-19 VIRUS: Status: ACTIVE | Noted: 2023-11-19

## 2023-11-19 LAB
ALBUMIN SERPL-MCNC: 3.6 G/DL (ref 3.5–5)
ALBUMIN/GLOB SERPL: 0.9 (ref 1.1–2.2)
ALP SERPL-CCNC: 80 U/L (ref 45–117)
ALT SERPL-CCNC: 38 U/L (ref 12–78)
ANION GAP SERPL CALC-SCNC: 6 MMOL/L (ref 5–15)
APPEARANCE UR: CLEAR
AST SERPL-CCNC: 38 U/L (ref 15–37)
BACTERIA URNS QL MICRO: NEGATIVE /HPF
BASOPHILS # BLD: 0.1 K/UL (ref 0–0.1)
BASOPHILS NFR BLD: 1 % (ref 0–1)
BILIRUB SERPL-MCNC: 0.7 MG/DL (ref 0.2–1)
BILIRUB UR QL: NEGATIVE
BUN SERPL-MCNC: 31 MG/DL (ref 6–20)
BUN/CREAT SERPL: 28 (ref 12–20)
CALCIUM SERPL-MCNC: 9 MG/DL (ref 8.5–10.1)
CHLORIDE SERPL-SCNC: 105 MMOL/L (ref 97–108)
CO2 SERPL-SCNC: 26 MMOL/L (ref 21–32)
COLOR UR: ABNORMAL
COMMENT:: NORMAL
COMMENT:: NORMAL
CREAT SERPL-MCNC: 1.12 MG/DL (ref 0.7–1.3)
CRP SERPL-MCNC: 4.36 MG/DL (ref 0–0.6)
D DIMER PPP FEU-MCNC: >35.2 MG/L FEU (ref 0–0.65)
DIFFERENTIAL METHOD BLD: ABNORMAL
EKG ATRIAL RATE: 98 BPM
EKG DIAGNOSIS: NORMAL
EKG P AXIS: 36 DEGREES
EKG P-R INTERVAL: 198 MS
EKG Q-T INTERVAL: 366 MS
EKG QRS DURATION: 100 MS
EKG QTC CALCULATION (BAZETT): 467 MS
EKG R AXIS: 239 DEGREES
EKG T AXIS: 96 DEGREES
EKG VENTRICULAR RATE: 98 BPM
EOSINOPHIL # BLD: 0.1 K/UL (ref 0–0.4)
EOSINOPHIL NFR BLD: 1 % (ref 0–7)
EPITH CASTS URNS QL MICRO: ABNORMAL /LPF
ERYTHROCYTE [DISTWIDTH] IN BLOOD BY AUTOMATED COUNT: 15.9 % (ref 11.5–14.5)
GLOBULIN SER CALC-MCNC: 3.8 G/DL (ref 2–4)
GLUCOSE SERPL-MCNC: 112 MG/DL (ref 65–100)
GLUCOSE UR STRIP.AUTO-MCNC: NEGATIVE MG/DL
HCT VFR BLD AUTO: 32 % (ref 36.6–50.3)
HGB BLD-MCNC: 10 G/DL (ref 12.1–17)
HGB UR QL STRIP: ABNORMAL
HYALINE CASTS URNS QL MICRO: ABNORMAL /LPF (ref 0–5)
IMM GRANULOCYTES # BLD AUTO: 0 K/UL (ref 0–0.04)
IMM GRANULOCYTES NFR BLD AUTO: 0 % (ref 0–0.5)
KETONES UR QL STRIP.AUTO: NEGATIVE MG/DL
LEUKOCYTE ESTERASE UR QL STRIP.AUTO: NEGATIVE
LYMPHOCYTES # BLD: 0.9 K/UL (ref 0.8–3.5)
LYMPHOCYTES NFR BLD: 12 % (ref 12–49)
MCH RBC QN AUTO: 25 PG (ref 26–34)
MCHC RBC AUTO-ENTMCNC: 31.3 G/DL (ref 30–36.5)
MCV RBC AUTO: 80 FL (ref 80–99)
MONOCYTES # BLD: 0.9 K/UL (ref 0–1)
MONOCYTES NFR BLD: 13 % (ref 5–13)
NEUTS SEG # BLD: 5 K/UL (ref 1.8–8)
NEUTS SEG NFR BLD: 73 % (ref 32–75)
NITRITE UR QL STRIP.AUTO: NEGATIVE
NRBC # BLD: 0 K/UL (ref 0–0.01)
NRBC BLD-RTO: 0 PER 100 WBC
NT PRO BNP: 2834 PG/ML
PH UR STRIP: 5.5 (ref 5–8)
PLATELET # BLD AUTO: 149 K/UL (ref 150–400)
PMV BLD AUTO: 9.2 FL (ref 8.9–12.9)
POTASSIUM SERPL-SCNC: 3.8 MMOL/L (ref 3.5–5.1)
PROT SERPL-MCNC: 7.4 G/DL (ref 6.4–8.2)
PROT UR STRIP-MCNC: ABNORMAL MG/DL
RBC # BLD AUTO: 4 M/UL (ref 4.1–5.7)
RBC #/AREA URNS HPF: ABNORMAL /HPF (ref 0–5)
SARS-COV-2 RDRP RESP QL NAA+PROBE: DETECTED
SODIUM SERPL-SCNC: 137 MMOL/L (ref 136–145)
SOURCE: ABNORMAL
SP GR UR REFRACTOMETRY: 1.02 (ref 1–1.03)
SPECIMEN HOLD: NORMAL
UROBILINOGEN UR QL STRIP.AUTO: 0.2 EU/DL (ref 0.2–1)
WBC # BLD AUTO: 6.9 K/UL (ref 4.1–11.1)
WBC URNS QL MICRO: ABNORMAL /HPF (ref 0–4)

## 2023-11-19 PROCEDURE — 72131 CT LUMBAR SPINE W/O DYE: CPT

## 2023-11-19 PROCEDURE — 85379 FIBRIN DEGRADATION QUANT: CPT

## 2023-11-19 PROCEDURE — XW0DXM6 INTRODUCTION OF BARICITINIB INTO MOUTH AND PHARYNX, EXTERNAL APPROACH, NEW TECHNOLOGY GROUP 6: ICD-10-PCS | Performed by: FAMILY MEDICINE

## 2023-11-19 PROCEDURE — 2580000003 HC RX 258: Performed by: FAMILY MEDICINE

## 2023-11-19 PROCEDURE — 1100000000 HC RM PRIVATE

## 2023-11-19 PROCEDURE — 86140 C-REACTIVE PROTEIN: CPT

## 2023-11-19 PROCEDURE — 72170 X-RAY EXAM OF PELVIS: CPT

## 2023-11-19 PROCEDURE — 87635 SARS-COV-2 COVID-19 AMP PRB: CPT

## 2023-11-19 PROCEDURE — 6360000002 HC RX W HCPCS: Performed by: FAMILY MEDICINE

## 2023-11-19 PROCEDURE — 71045 X-RAY EXAM CHEST 1 VIEW: CPT

## 2023-11-19 PROCEDURE — 36415 COLL VENOUS BLD VENIPUNCTURE: CPT

## 2023-11-19 PROCEDURE — 80053 COMPREHEN METABOLIC PANEL: CPT

## 2023-11-19 PROCEDURE — 85025 COMPLETE CBC W/AUTO DIFF WBC: CPT

## 2023-11-19 PROCEDURE — 6370000000 HC RX 637 (ALT 250 FOR IP): Performed by: FAMILY MEDICINE

## 2023-11-19 PROCEDURE — 83880 ASSAY OF NATRIURETIC PEPTIDE: CPT

## 2023-11-19 PROCEDURE — 70450 CT HEAD/BRAIN W/O DYE: CPT

## 2023-11-19 PROCEDURE — 72128 CT CHEST SPINE W/O DYE: CPT

## 2023-11-19 PROCEDURE — 99285 EMERGENCY DEPT VISIT HI MDM: CPT

## 2023-11-19 PROCEDURE — 81001 URINALYSIS AUTO W/SCOPE: CPT

## 2023-11-19 PROCEDURE — 72125 CT NECK SPINE W/O DYE: CPT

## 2023-11-19 PROCEDURE — 93005 ELECTROCARDIOGRAM TRACING: CPT | Performed by: STUDENT IN AN ORGANIZED HEALTH CARE EDUCATION/TRAINING PROGRAM

## 2023-11-19 RX ORDER — MEMANTINE HYDROCHLORIDE 5 MG/1
10 TABLET ORAL 2 TIMES DAILY
Status: DISCONTINUED | OUTPATIENT
Start: 2023-11-19 | End: 2023-11-21 | Stop reason: HOSPADM

## 2023-11-19 RX ORDER — CLOPIDOGREL BISULFATE 75 MG/1
75 TABLET ORAL DAILY
Status: DISCONTINUED | OUTPATIENT
Start: 2023-11-19 | End: 2023-11-21 | Stop reason: HOSPADM

## 2023-11-19 RX ORDER — TAMSULOSIN HYDROCHLORIDE 0.4 MG/1
0.4 CAPSULE ORAL DAILY
Status: DISCONTINUED | OUTPATIENT
Start: 2023-11-20 | End: 2023-11-21 | Stop reason: HOSPADM

## 2023-11-19 RX ORDER — QUETIAPINE FUMARATE 25 MG/1
25 TABLET, FILM COATED ORAL NIGHTLY
Status: DISCONTINUED | OUTPATIENT
Start: 2023-11-19 | End: 2023-11-21 | Stop reason: HOSPADM

## 2023-11-19 RX ORDER — CARVEDILOL 3.12 MG/1
3.12 TABLET ORAL 2 TIMES DAILY
Status: DISCONTINUED | OUTPATIENT
Start: 2023-11-19 | End: 2023-11-21 | Stop reason: HOSPADM

## 2023-11-19 RX ORDER — GUAIFENESIN/DEXTROMETHORPHAN 100-10MG/5
5 SYRUP ORAL EVERY 4 HOURS PRN
Status: DISCONTINUED | OUTPATIENT
Start: 2023-11-19 | End: 2023-11-21 | Stop reason: HOSPADM

## 2023-11-19 RX ORDER — ENOXAPARIN SODIUM 100 MG/ML
40 INJECTION SUBCUTANEOUS DAILY
Status: DISCONTINUED | OUTPATIENT
Start: 2023-11-19 | End: 2023-11-21 | Stop reason: HOSPADM

## 2023-11-19 RX ORDER — SODIUM CHLORIDE 0.9 % (FLUSH) 0.9 %
5-40 SYRINGE (ML) INJECTION EVERY 12 HOURS SCHEDULED
Status: DISCONTINUED | OUTPATIENT
Start: 2023-11-19 | End: 2023-11-21 | Stop reason: HOSPADM

## 2023-11-19 RX ORDER — QUETIAPINE FUMARATE 25 MG/1
12.5 TABLET, FILM COATED ORAL 2 TIMES DAILY
Status: DISCONTINUED | OUTPATIENT
Start: 2023-11-19 | End: 2023-11-19

## 2023-11-19 RX ORDER — ACETAMINOPHEN 650 MG/1
650 SUPPOSITORY RECTAL EVERY 6 HOURS PRN
Status: DISCONTINUED | OUTPATIENT
Start: 2023-11-19 | End: 2023-11-19 | Stop reason: SDUPTHER

## 2023-11-19 RX ORDER — ACETAMINOPHEN 325 MG/1
650 TABLET ORAL EVERY 6 HOURS PRN
Status: DISCONTINUED | OUTPATIENT
Start: 2023-11-19 | End: 2023-11-19 | Stop reason: SDUPTHER

## 2023-11-19 RX ORDER — ONDANSETRON 2 MG/ML
4 INJECTION INTRAMUSCULAR; INTRAVENOUS EVERY 6 HOURS PRN
Status: DISCONTINUED | OUTPATIENT
Start: 2023-11-19 | End: 2023-11-21 | Stop reason: HOSPADM

## 2023-11-19 RX ORDER — ASPIRIN 81 MG/1
81 TABLET, CHEWABLE ORAL DAILY
Status: DISCONTINUED | OUTPATIENT
Start: 2023-11-19 | End: 2023-11-21 | Stop reason: HOSPADM

## 2023-11-19 RX ORDER — QUETIAPINE FUMARATE 25 MG/1
12.5 TABLET, FILM COATED ORAL DAILY
Status: DISCONTINUED | OUTPATIENT
Start: 2023-11-20 | End: 2023-11-21 | Stop reason: HOSPADM

## 2023-11-19 RX ORDER — HYDRALAZINE HYDROCHLORIDE 25 MG/1
25 TABLET, FILM COATED ORAL 3 TIMES DAILY
Status: DISCONTINUED | OUTPATIENT
Start: 2023-11-19 | End: 2023-11-21 | Stop reason: HOSPADM

## 2023-11-19 RX ORDER — DEXAMETHASONE SODIUM PHOSPHATE 10 MG/ML
6 INJECTION, SOLUTION INTRAMUSCULAR; INTRAVENOUS EVERY 24 HOURS
Status: DISCONTINUED | OUTPATIENT
Start: 2023-11-19 | End: 2023-11-21 | Stop reason: HOSPADM

## 2023-11-19 RX ORDER — ACETAMINOPHEN 650 MG/1
650 SUPPOSITORY RECTAL EVERY 6 HOURS PRN
Status: DISCONTINUED | OUTPATIENT
Start: 2023-11-19 | End: 2023-11-21 | Stop reason: HOSPADM

## 2023-11-19 RX ORDER — SODIUM CHLORIDE 9 MG/ML
INJECTION, SOLUTION INTRAVENOUS PRN
Status: DISCONTINUED | OUTPATIENT
Start: 2023-11-19 | End: 2023-11-21 | Stop reason: HOSPADM

## 2023-11-19 RX ORDER — SODIUM CHLORIDE 0.9 % (FLUSH) 0.9 %
5-40 SYRINGE (ML) INJECTION PRN
Status: DISCONTINUED | OUTPATIENT
Start: 2023-11-19 | End: 2023-11-21 | Stop reason: HOSPADM

## 2023-11-19 RX ORDER — ACETAMINOPHEN 325 MG/1
650 TABLET ORAL EVERY 6 HOURS PRN
Status: DISCONTINUED | OUTPATIENT
Start: 2023-11-19 | End: 2023-11-21 | Stop reason: HOSPADM

## 2023-11-19 RX ORDER — SENNOSIDES A AND B 8.6 MG/1
1 TABLET, FILM COATED ORAL DAILY PRN
Status: DISCONTINUED | OUTPATIENT
Start: 2023-11-19 | End: 2023-11-21 | Stop reason: HOSPADM

## 2023-11-19 RX ORDER — ATORVASTATIN CALCIUM 40 MG/1
40 TABLET, FILM COATED ORAL NIGHTLY
Status: DISCONTINUED | OUTPATIENT
Start: 2023-11-19 | End: 2023-11-21 | Stop reason: HOSPADM

## 2023-11-19 RX ADMIN — ENOXAPARIN SODIUM 40 MG: 100 INJECTION SUBCUTANEOUS at 17:44

## 2023-11-19 RX ADMIN — HYDRALAZINE HYDROCHLORIDE 25 MG: 25 TABLET, FILM COATED ORAL at 22:47

## 2023-11-19 RX ADMIN — ATORVASTATIN CALCIUM 40 MG: 40 TABLET, FILM COATED ORAL at 22:47

## 2023-11-19 RX ADMIN — DEXAMETHASONE SODIUM PHOSPHATE 6 MG: 10 INJECTION INTRAMUSCULAR; INTRAVENOUS at 17:44

## 2023-11-19 RX ADMIN — QUETIAPINE FUMARATE 25 MG: 25 TABLET ORAL at 22:47

## 2023-11-19 RX ADMIN — Medication 10 ML: at 23:18

## 2023-11-19 RX ADMIN — CLOPIDOGREL BISULFATE 75 MG: 75 TABLET ORAL at 23:35

## 2023-11-19 RX ADMIN — CARVEDILOL 3.12 MG: 3.12 TABLET, FILM COATED ORAL at 22:47

## 2023-11-19 RX ADMIN — BARICITINIB 4 MG: 2 TABLET, FILM COATED ORAL at 22:48

## 2023-11-19 RX ADMIN — ASPIRIN 81 MG CHEWABLE TABLET 81 MG: 81 TABLET CHEWABLE at 23:34

## 2023-11-19 RX ADMIN — MEMANTINE 10 MG: 5 TABLET ORAL at 22:47

## 2023-11-19 NOTE — CARE COORDINATION
Orientation Unable to Assess  (Patient sleeping.)   Cognition Severely Impaired   History Provided By Child/Family; Spouse; Other (see comment)  (MILLER RN)   Primary Caregiver Spouse   Accompanied By/Relationship wife   Support Systems Spouse/Significant Other;Children;Home Care Staff; Other (Comment)  (MILLER RN and aid)   Patient's Mike Nunez is: Legal Next of Kin  (wife Fabby Colon [phone])   PCP Verified by CM Yes  (MILLER)   Last Visit to PCP Within last 3 months   Prior Functional Level Assistance with the following:;Bathing;Dressing;Cooking;Mobility; Toileting;Housework; Shopping   Current Functional Level Bathing;Dressing; Toileting;Cooking;Housework; Shopping;Mobility;Assistance with the following:   Can patient return to prior living arrangement Yes   Ability to make needs known: Poor   Family able to assist with home care needs: Yes   Would you like for me to discuss the discharge plan with any other family members/significant others, and if so, who? Yes  (wife, children)   Financial Resources Medicare;Medicaid  (MILLER provides all of his care.)   Social/Functional History   Lives With Spouse   Type of [address] [place] [address] Dr One level   [address] to enter with rails   Entrance Stairs - Number of Steps 2   Home Equipment Cane;Walker, rolling   Receives Help From Family   ADL Assistance Needs assistance   Bath Dependent/Total   Dressing Dependent/Total   Grooming Dependent/Total   Toileting Needs assistance   Ambulation Assistance Independent   Active  No   Mode of Transportation Car   Occupation Retired   Discharge Planning   Type of Residence Skilled Nursing Facility;Long-Term Care  (Frank will authorize respite NH level for patient.)   Living Arrangements Spouse/Significant Other   Current Services Prior To Admission [address] [place] [address]; [facility]   Potential Assistance Purchasing Medications No   Patient expects to be discharged to: Long-term care   Services At/After Discharge   Transition of Care Consult (CM Consult) Transportation Assistance   Condition of Participation: Discharge Planning   The Plan for Transition of Care is related to the following treatment goals: nursing facility for respite     FORTUNATO Fried

## 2023-11-19 NOTE — ED TRIAGE NOTES
EMS reports patient has had multiple falls. Family requested that he be assessed for injury.  +hx alzheimers, patient c/o only of upper back pain

## 2023-11-19 NOTE — H&P
History and Physical    Date of Service:  11/19/2023  Primary Care Provider: Tonia Walsh MD  Source of information: spouse, electronic medical record    Chief Complaint: Fall      History of Presenting Illness:   Christie Milligan is a 72 y.o. male who presents from home via EMS with reports that the patient has had multiple falls. Family requested that he be assessed for injury. +hx alzheimers, patient c/o only of upper back pain. Patient has been attending the outpatient pace program which was recently shut down due to a COVID outbreak. In the emergency room patient appeared weak and lethargic-he was evaluated with multiple imaging and no evidence of any specific fractures were noted-he was tested for COVID and was found to be positive-his blood pressures are significantly elevated and he does have a cough and some shortness of breath. Admission requested by family due to patient inability to walk as well as severe lethargy and cough. Patient was unable to voice any specific complaints for me but was very lethargic had trouble staying awake and was not following basic commands. I spent 15 minutes reviewing his home medication list-and 15 minutes completing H&P with details only available from patient's wife not found in the medical record       REVIEW OF SYSTEMS:  A comprehensive review of systems was negative except for that written in the History of Present Illness.    The review of systems was completed with the help of the patient's wife as patient currently is nonparticipatory in questioning    Past medical history-advanced dementia, hypertension, hyperlipidemia, coronary artery disease, aortic disease/aneurysm, degenerative joint disease, borderline diabetes, anemia, depression, BPH,    Past surgical history includes abdominal aortic aneurysm repair/graft; repair of iliac artery aneurysm, patient has had a heart cath in 2018, patient has had urological surgery specifics of which are Meaghan Barajas MD     November 19, 2023

## 2023-11-20 PROBLEM — R62.7 FAILURE TO THRIVE IN ADULT: Status: ACTIVE | Noted: 2023-11-20

## 2023-11-20 PROBLEM — Z51.5 PALLIATIVE CARE BY SPECIALIST: Status: ACTIVE | Noted: 2023-11-20

## 2023-11-20 PROBLEM — U07.1 COVID-19: Status: ACTIVE | Noted: 2023-11-20

## 2023-11-20 LAB
25(OH)D3 SERPL-MCNC: 36 NG/ML (ref 30–100)
ANION GAP SERPL CALC-SCNC: 5 MMOL/L (ref 5–15)
BUN SERPL-MCNC: 31 MG/DL (ref 6–20)
BUN/CREAT SERPL: 26 (ref 12–20)
CALCIUM SERPL-MCNC: 8.6 MG/DL (ref 8.5–10.1)
CHLORIDE SERPL-SCNC: 104 MMOL/L (ref 97–108)
CO2 SERPL-SCNC: 24 MMOL/L (ref 21–32)
CREAT SERPL-MCNC: 1.17 MG/DL (ref 0.7–1.3)
CRP SERPL-MCNC: 6.28 MG/DL (ref 0–0.6)
D DIMER PPP FEU-MCNC: 17.77 MG/L FEU (ref 0–0.65)
ERYTHROCYTE [DISTWIDTH] IN BLOOD BY AUTOMATED COUNT: 15.9 % (ref 11.5–14.5)
GLUCOSE SERPL-MCNC: 165 MG/DL (ref 65–100)
HCT VFR BLD AUTO: 31.4 % (ref 36.6–50.3)
HGB BLD-MCNC: 10 G/DL (ref 12.1–17)
MCH RBC QN AUTO: 25.1 PG (ref 26–34)
MCHC RBC AUTO-ENTMCNC: 31.8 G/DL (ref 30–36.5)
MCV RBC AUTO: 78.9 FL (ref 80–99)
NRBC # BLD: 0 K/UL (ref 0–0.01)
NRBC BLD-RTO: 0 PER 100 WBC
NT PRO BNP: 3756 PG/ML
PLATELET # BLD AUTO: 150 K/UL (ref 150–400)
PMV BLD AUTO: 9.7 FL (ref 8.9–12.9)
POTASSIUM SERPL-SCNC: 3.9 MMOL/L (ref 3.5–5.1)
RBC # BLD AUTO: 3.98 M/UL (ref 4.1–5.7)
SODIUM SERPL-SCNC: 133 MMOL/L (ref 136–145)
WBC # BLD AUTO: 6.5 K/UL (ref 4.1–11.1)

## 2023-11-20 PROCEDURE — 2580000003 HC RX 258: Performed by: FAMILY MEDICINE

## 2023-11-20 PROCEDURE — 97165 OT EVAL LOW COMPLEX 30 MIN: CPT

## 2023-11-20 PROCEDURE — 36415 COLL VENOUS BLD VENIPUNCTURE: CPT

## 2023-11-20 PROCEDURE — 85379 FIBRIN DEGRADATION QUANT: CPT

## 2023-11-20 PROCEDURE — 6370000000 HC RX 637 (ALT 250 FOR IP): Performed by: FAMILY MEDICINE

## 2023-11-20 PROCEDURE — 86140 C-REACTIVE PROTEIN: CPT

## 2023-11-20 PROCEDURE — 80048 BASIC METABOLIC PNL TOTAL CA: CPT

## 2023-11-20 PROCEDURE — 1100000000 HC RM PRIVATE

## 2023-11-20 PROCEDURE — 6360000002 HC RX W HCPCS: Performed by: FAMILY MEDICINE

## 2023-11-20 PROCEDURE — 83880 ASSAY OF NATRIURETIC PEPTIDE: CPT

## 2023-11-20 PROCEDURE — 97161 PT EVAL LOW COMPLEX 20 MIN: CPT

## 2023-11-20 PROCEDURE — 85027 COMPLETE CBC AUTOMATED: CPT

## 2023-11-20 PROCEDURE — 97530 THERAPEUTIC ACTIVITIES: CPT

## 2023-11-20 PROCEDURE — 82306 VITAMIN D 25 HYDROXY: CPT

## 2023-11-20 PROCEDURE — 99221 1ST HOSP IP/OBS SF/LOW 40: CPT | Performed by: INTERNAL MEDICINE

## 2023-11-20 RX ADMIN — HYDRALAZINE HYDROCHLORIDE 25 MG: 25 TABLET, FILM COATED ORAL at 09:46

## 2023-11-20 RX ADMIN — Medication 10 ML: at 09:47

## 2023-11-20 RX ADMIN — HYDRALAZINE HYDROCHLORIDE 25 MG: 25 TABLET, FILM COATED ORAL at 21:21

## 2023-11-20 RX ADMIN — ASPIRIN 81 MG CHEWABLE TABLET 81 MG: 81 TABLET CHEWABLE at 09:39

## 2023-11-20 RX ADMIN — ENOXAPARIN SODIUM 40 MG: 100 INJECTION SUBCUTANEOUS at 09:46

## 2023-11-20 RX ADMIN — MEMANTINE 10 MG: 5 TABLET ORAL at 21:21

## 2023-11-20 RX ADMIN — TAMSULOSIN HYDROCHLORIDE 0.4 MG: 0.4 CAPSULE ORAL at 09:40

## 2023-11-20 RX ADMIN — DEXAMETHASONE SODIUM PHOSPHATE 6 MG: 10 INJECTION INTRAMUSCULAR; INTRAVENOUS at 15:19

## 2023-11-20 RX ADMIN — Medication 10 ML: at 21:21

## 2023-11-20 RX ADMIN — MEMANTINE 10 MG: 5 TABLET ORAL at 09:39

## 2023-11-20 RX ADMIN — QUETIAPINE FUMARATE 25 MG: 25 TABLET ORAL at 21:21

## 2023-11-20 RX ADMIN — QUETIAPINE FUMARATE 12.5 MG: 25 TABLET ORAL at 09:39

## 2023-11-20 RX ADMIN — BARICITINIB 4 MG: 2 TABLET, FILM COATED ORAL at 21:21

## 2023-11-20 RX ADMIN — ATORVASTATIN CALCIUM 40 MG: 40 TABLET, FILM COATED ORAL at 21:21

## 2023-11-20 RX ADMIN — CARVEDILOL 3.12 MG: 3.12 TABLET, FILM COATED ORAL at 09:46

## 2023-11-20 RX ADMIN — CLOPIDOGREL BISULFATE 75 MG: 75 TABLET ORAL at 09:40

## 2023-11-20 RX ADMIN — CARVEDILOL 3.12 MG: 3.12 TABLET, FILM COATED ORAL at 21:21

## 2023-11-20 RX ADMIN — SERTRALINE HYDROCHLORIDE 100 MG: 50 TABLET ORAL at 09:42

## 2023-11-20 RX ADMIN — HYDRALAZINE HYDROCHLORIDE 25 MG: 25 TABLET, FILM COATED ORAL at 15:19

## 2023-11-20 NOTE — PROGRESS NOTES
Assumed care at [address] [place] [address]. Pt has been resting comfortably in bed overnight. Pt tolerated medication fine. See mar. Pt has been incontinent and un willing to answer questions. Bed in low position. Patient on covid precautions.

## 2023-11-20 NOTE — CARE COORDINATION
Transition of Care Plan:    RUR: 17%  Prior Level of Functioning: independent   Disposition: SNF   If SNF or IPR: Date FOC offered: 11/20  Date FOC received: 11/20  Accepting facility: referral pending with [facility]  Date authorization started with reference number: no auth required  Date authorization received and expires: no auth required  Follow up appointments: pcp/specialist   DME needed: n/a  Transportation at discharge: wheelchair vs bls  IM/IMM Medicare/ letter given: may need prior to discharge   Caregiver Contact:   Primary Decision Maker: Daron Ivy - [phone]    Primary Decision Maker: Emma Janusz - Spouse - [phone]    Discharge Caregiver contacted prior to discharge? yes  Care Conference needed? no  Barriers to discharge:  SNF acceptance. Cm spoke with patient wife, Hansa Bull via telephone. She would like a referral sent to [facility], referral sent and response is pending.       Masha Giang, Solomon0 [facility] Management Department  UR:[phone]

## 2023-11-20 NOTE — PROGRESS NOTES
Spiritual Care Assessment/Progress Note  Palmira ThompsonDexter    Name: Alvaro Almaraz MRN: [id number]    Age: 72 y.o. Sex: male   Language: English     Date: 11/20/2023            Total Time Calculated: 15 min              Spiritual Assessment begun in [address] [facility]  Service Provided For[de-identified] Patient not available  Referral/Consult From[de-identified] Palliative Care  Encounter Overview/Reason : Palliative Care    Spiritual beliefs:      [] Involved in a octavio tradition/spiritual practice:      [] Supported by a octavio community:      [] Claims no spiritual orientation:      [] Seeking spiritual identity:           [] Adheres to an individual form of spirituality:      [x] Not able to assess:                Identified resources for coping and support system:   Support System: Palliative Care, Significant other, Children       [] Prayer                  [] Devotional reading               [] Music                  [] Guided Imagery     [] Pet visits                                        [] Other: (COMMENT)     Specific area/focus of visit   Encounter:    Crisis:    Spiritual/Emotional needs:    Ritual, Rites and Sacraments:    Grief, Loss, and Adjustments:    Ethics/Mediation:    Behavioral Health:    Palliative Care: Type: Palliative Care, Initial/Spiritual Assessment  Advance Care Planning:      Attempted visit for palliative initial spiritual assessment. Unable to visit patient at this time. Patient was sleeping and did not awake. No family present. Patient's chart was consulted.   Chaplain Sousa MDiv, MS, [facility]

## 2023-11-20 NOTE — PLAN OF CARE
Initiate Treatment: Sorilux foam (apply twice daily to itchy or irritated areas)\\nSpecial compounded cream twice daily to areas of itching Problem: Occupational Therapy - Adult  Goal: By Discharge: Performs self-care activities at highest level of function for planned discharge setting. See evaluation for individualized goals. Description: FUNCTIONAL STATUS PRIOR TO ADMISSION:  Patient poor historian. Per chart he lives with wife who assists with ADL tasks. He ambulates at [facility] level. Attends [facility] day program.   Juan José Cm Help From: Family, ADL Assistance: Needs assistance, Bath: Dependent/Total, Dressing: Dependent/Total, Grooming: Dependent/Total,  , Toileting: Needs assistance,  , Ambulation Assistance: Independent,  , Active : No     HOME SUPPORT: Patient lived wife. Occupational Therapy Goals:  Initiated 11/20/2023  1. Patient will perform grooming with Minimal Assist within 7 day(s). 2.  Patient will perform UB bathing and anterior LB with Minimal Assist within 7 day(s). 3.  Patient will perform toilet transfers with Contact Guard Assist  within 7 day(s). 4.  Patient will perform all aspects of toileting with Moderate Assist within 7 day(s). 11/20/2023 1418 by Florentin Moyer OT  Outcome: Progressing  11/20/2023 1416 by Florentin Moyer OT  Outcome: Progressing   OCCUPATIONAL THERAPY EVALUATION    Patient: Roger Garcia (45 y.o. male)  Date: 11/20/2023  Primary Diagnosis: Delirium [R41.0]  Failure to thrive in adult [R62.7]  Pneumonia due to COVID-19 virus [U07.1, J12.82]  COVID-19 [U07.1]         Precautions: Fall Risk, Bed Alarm                  ASSESSMENT :  The patient is limited by decreased functional mobility, independence in ADLs, strength, activity tolerance, endurance, safety awareness, cognition, command following, coordination, balance with admission for falls and COVID. Patient oriented to self only. Command following <50% with one step commands with multi-modal cue. Total A for sock mgmt. Sit > stand with min A and demo ability to ambulate to bathroom with B HHA with overall min A.    May benefit from [facility] Complexity: No comorbidities that affect functional and  no verbal  or physical assist needed to complete eval tasks   Based on the above components, the patient evaluation is determined to be of the following complexity level: Low

## 2023-11-20 NOTE — CONSULTS
Palliative Medicine  Patient Name: Tara Nelson  YOB: 1950  MRN: [id number]  Age: 72 y.o. Gender: male    Date of Initial Consult: 11/20/2023  Date of Service: 11/20/2023  Time: 10:07 AM  Provider: Giovanni Wolfe MD  Hospital Day: 2  Admit Date: 11/19/2023  Referring Provider: Dr. Saurabh Kelley       Reasons for Consultation:  Goals of Care    HISTORY OF PRESENT ILLNESS (HPI):   Tara Nelson is a 72 y.o. male with a past medical history of Alzheimer's dementia, BPH, Coronary artery disease, HTN, who was admitted on 11/19/2023 from home with a diagnosis of multiple falls,cough, lethargy, COVID outbreak at his day facility. Patient found to be COVID+. Psychosocial: patient is , wife Austin Cook [phone]  Wife provides his care at home, assistance with eating, dressing. He is ambulatory with a cane in the home, unsteady. Retired salesman. Lived in [place], until moved to [place] to be near their daughterJames [phone]      PALLIATIVE DIAGNOSES:    COVID+   Cough, lethargy- improved  Poor PO intake - requires assistance for feeding  Debility, fall risk   Alzheimer's dementia   Palliative medicine encounter         ASSESSMENT AND PLAN:   Patient is enrolled in [facility] program, awaiting placement in LTC, wife not able to provide his care at home  Patient has DNR/ DNI, would benefit from completion of DDNR if not already done by [facility] program  Will reach out to wife later today to check in. Anticipate recovery from acute COVID with supportive care. Initial consult note routed to primary continuity provider and/or primary health care team members  Please call with any palliative questions or concerns. Palliative Care Team is available via perfect serve or via phone. Addendum:  call placed to patient's wife, no answer.      Referrals to:   [] Outpatient Palliative Care  [] Home Based Palliative Care  [] Home Based Primary Care  [] Hospice       ADVANCE CARE PLANNING:   [] [facility]

## 2023-11-21 VITALS
DIASTOLIC BLOOD PRESSURE: 92 MMHG | OXYGEN SATURATION: 97 % | HEART RATE: 84 BPM | SYSTOLIC BLOOD PRESSURE: 148 MMHG | TEMPERATURE: 98.1 F | RESPIRATION RATE: 18 BRPM

## 2023-11-21 LAB
ANION GAP SERPL CALC-SCNC: 3 MMOL/L (ref 5–15)
BUN SERPL-MCNC: 42 MG/DL (ref 6–20)
BUN/CREAT SERPL: 30 (ref 12–20)
CALCIUM SERPL-MCNC: 8.8 MG/DL (ref 8.5–10.1)
CHLORIDE SERPL-SCNC: 105 MMOL/L (ref 97–108)
CO2 SERPL-SCNC: 25 MMOL/L (ref 21–32)
COMMENT:: NORMAL
CREAT SERPL-MCNC: 1.41 MG/DL (ref 0.7–1.3)
CRP SERPL-MCNC: 3.36 MG/DL (ref 0–0.6)
GLUCOSE SERPL-MCNC: 104 MG/DL (ref 65–100)
NT PRO BNP: 1116 PG/ML
POTASSIUM SERPL-SCNC: ABNORMAL MMOL/L (ref 3.5–5.1)
SODIUM SERPL-SCNC: 133 MMOL/L (ref 136–145)
SPECIMEN HOLD: NORMAL

## 2023-11-21 PROCEDURE — 6360000002 HC RX W HCPCS

## 2023-11-21 PROCEDURE — 6360000002 HC RX W HCPCS: Performed by: FAMILY MEDICINE

## 2023-11-21 PROCEDURE — 2580000003 HC RX 258: Performed by: FAMILY MEDICINE

## 2023-11-21 PROCEDURE — 97116 GAIT TRAINING THERAPY: CPT

## 2023-11-21 PROCEDURE — 80048 BASIC METABOLIC PNL TOTAL CA: CPT

## 2023-11-21 PROCEDURE — 86140 C-REACTIVE PROTEIN: CPT

## 2023-11-21 PROCEDURE — 36415 COLL VENOUS BLD VENIPUNCTURE: CPT

## 2023-11-21 PROCEDURE — 83880 ASSAY OF NATRIURETIC PEPTIDE: CPT

## 2023-11-21 PROCEDURE — 6370000000 HC RX 637 (ALT 250 FOR IP): Performed by: FAMILY MEDICINE

## 2023-11-21 PROCEDURE — 97530 THERAPEUTIC ACTIVITIES: CPT

## 2023-11-21 RX ORDER — HYDRALAZINE HYDROCHLORIDE 20 MG/ML
10 INJECTION INTRAMUSCULAR; INTRAVENOUS ONCE
Status: COMPLETED | OUTPATIENT
Start: 2023-11-21 | End: 2023-11-21

## 2023-11-21 RX ORDER — QUETIAPINE FUMARATE 25 MG/1
TABLET, FILM COATED ORAL
Qty: 45 TABLET | Refills: 0 | Status: SHIPPED | OUTPATIENT
Start: 2023-11-21 | End: 2024-01-20

## 2023-11-21 RX ORDER — ACETAMINOPHEN 325 MG/1
650 TABLET ORAL EVERY 4 HOURS PRN
Qty: 30 TABLET | Refills: 0 | Status: SHIPPED
Start: 2023-11-21

## 2023-11-21 RX ORDER — DEXAMETHASONE 6 MG/1
6 TABLET ORAL
Qty: 8 TABLET | Refills: 0 | Status: SHIPPED | OUTPATIENT
Start: 2023-11-21 | End: 2023-11-29

## 2023-11-21 RX ORDER — GUAIFENESIN/DEXTROMETHORPHAN 100-10MG/5
5 SYRUP ORAL EVERY 4 HOURS PRN
Qty: 120 ML | Refills: 0 | Status: SHIPPED
Start: 2023-11-21 | End: 2023-12-01

## 2023-11-21 RX ORDER — SENNOSIDES A AND B 8.6 MG/1
1 TABLET, FILM COATED ORAL DAILY PRN
Qty: 30 TABLET | Refills: 0 | Status: SHIPPED
Start: 2023-11-21 | End: 2023-12-21

## 2023-11-21 RX ADMIN — QUETIAPINE FUMARATE 12.5 MG: 25 TABLET ORAL at 10:00

## 2023-11-21 RX ADMIN — CLOPIDOGREL BISULFATE 75 MG: 75 TABLET ORAL at 10:01

## 2023-11-21 RX ADMIN — MEMANTINE 10 MG: 5 TABLET ORAL at 10:00

## 2023-11-21 RX ADMIN — CARVEDILOL 3.12 MG: 3.12 TABLET, FILM COATED ORAL at 10:01

## 2023-11-21 RX ADMIN — Medication 10 ML: at 10:01

## 2023-11-21 RX ADMIN — ENOXAPARIN SODIUM 40 MG: 100 INJECTION SUBCUTANEOUS at 10:01

## 2023-11-21 RX ADMIN — ASPIRIN 81 MG CHEWABLE TABLET 81 MG: 81 TABLET CHEWABLE at 10:01

## 2023-11-21 RX ADMIN — HYDRALAZINE HYDROCHLORIDE 25 MG: 25 TABLET, FILM COATED ORAL at 10:01

## 2023-11-21 RX ADMIN — SERTRALINE HYDROCHLORIDE 100 MG: 50 TABLET ORAL at 10:00

## 2023-11-21 RX ADMIN — HYDRALAZINE HYDROCHLORIDE 10 MG: 20 INJECTION, SOLUTION INTRAMUSCULAR; INTRAVENOUS at 02:55

## 2023-11-21 RX ADMIN — TAMSULOSIN HYDROCHLORIDE 0.4 MG: 0.4 CAPSULE ORAL at 10:00

## 2023-11-21 NOTE — DISCHARGE SUMMARY
Discharge Summary       PATIENT ID: Adam Bragg  MRN: [id number]   YOB: 1950    DATE OF ADMISSION: 11/19/2023 11:04 AM    DATE OF DISCHARGE: 11/21/23   PRIMARY CARE PROVIDER: Johanna Groves MD     ATTENDING PHYSICIAN: ISABEL  DISCHARGING PROVIDER: Kaity Jon MD      CONSULTATIONS: IP CONSULT TO CASE MANAGEMENT  IP CONSULT TO PALLIATIVE CARE  IP CONSULT TO PHARMACY    PROCEDURES/SURGERIES: * No surgery found *    ADMISSION SUMMARY AND HOSPITAL COURSE:     Adam Bragg is a 72 y.o. male who presents from home via EMS with reports that the patient has had multiple falls. Family requested that he be assessed for injury. +hx alzheimers, patient c/o only of upper back pain. Patient has been attending the outpatient pace program which was recently shut down due to a COVID outbreak. In the emergency room patient appeared weak and lethargic-he was evaluated with multiple imaging and no evidence of any specific fractures were noted-he was tested for COVID and was found to be positive-his blood pressures are significantly elevated and he does have a cough and some shortness of breath. Admission requested by family due to patient inability to walk as well as severe lethargy and cough. Patient was unable to voice any specific complaints for me but was very lethargic had trouble staying awake and was not following basic commands. I spent 15 minutes reviewing his home medication list-and 15 minutes completing H&P with details only available from patient's wife not found in the medical record    [place] / PLAN:         1. Acute COVID-19 infection  Causing cough shortness of breath lethargy and fatigue  Elevated CRP levels but normal oxygen levels currently on room air  cont dexamethasone 6 mg daily; OlElijah normanitussin  OT PT eval ; Monitor COVID labs including CRP D-dimer and BNP     2. Essential hypertension-stable BP and HR   3. Hyperlipidemia-cont statin medication  4.

## 2023-11-21 NOTE — DISCHARGE INSTRUCTIONS
You were admitted and evaluated for lethargy, weakness and falls at home.   You were found to be positive for COVID and labs showed mild dehydration   You were evaluated with labs, scans, and treated with IV fluids and IV medications  Your respiratory status was stable and you are no longer requiring oxygen for treatment  Low Carbohydrate diet, activity as tolerated, Respiratory isolation per facility protocols  Continue OT/PT as tolerated, ; Fall risk with cognitive impairment

## 2023-11-21 NOTE — CARE COORDINATION
Transition of Care Plan:    RUR: 16%; Moderate  Prior Level of Functioning: Independent   Disposition: SNF - Accepted by [facility] and facility selected in Epic   If SNF or IPR: Date FOC offered:   Date FOC received:   Accepting facility: [facility]  Date authorization started with reference number: [facility] Contract Is In Place With [facility]   Follow up appointments: PCP  DME needed:   Transportation at discharge:   IM/IMM Medicare/ letter given:   Caregiver Contact: Leela Neumann: [phone]  Discharge Caregiver contacted prior to discharge? Yes  Care Conference needed? Not At This Time  Barriers to discharge: Verification of [facility] Auth for Contracted Facility    0930:  CM notes that the patient has been accepted by [facility]. Facility was selected in [place], and Attending notified of accepted status, who identified that the patient is anticipated to be cleared for discharge today, . CM contacted [address] (Marianela Madrigal: [phone]) who will return call to  with room assignment. CM contacted [facility] (Harish Donis: [phone], Option 3) and left a HIPAA Compliant VM requesting a return call to discuss the patient's DIO Plan. CM contacted again and left a HIPAA Compliant VM for discharge planner, Jose Perkins, requesting a return call. CM contacted patient's listed emergency contact (spouse, Umesh Britt: [phone]) and discussed the patient's disposition. Patient's spouse is in agreement to the patient's discharge to [facility]. CM to return call with confirmed transport time. CRM: Karma Avelar, MPH, CHES  Z: [phone]     5876 -    received call from Marianela Madrigal identifying that the patient has been accepted to Room 105 and the facility can accept the patient after 1300. CM attempted to reach patient's [facility] SW Velasquez Lynn: [phone]) x2 and left HIPAA Compliant VM.   CM contacted [facility] ([phone], Option 3); no individuals answered the phone, so CM left a detailed message with

## 2023-11-21 NOTE — PLAN OF CARE
Problem: Safety - Adult  Goal: Free from fall injury  11/20/2023 2306 by Matthew Bennett RN  Outcome: Not Progressing     Problem: Skin/Tissue Integrity  Goal: Absence of new skin breakdown  Description: 1. Monitor for areas of redness and/or skin breakdown  2. Assess vascular access sites hourly  3. Every 4-6 hours minimum:  Change oxygen saturation probe site  4. Every 4-6 hours:  If on nasal continuous positive airway pressure, respiratory therapy assess nares and determine need for appliance change or resting period. 11/20/2023 2306 by Matthew Bennett RN  Outcome: Progressing     Problem: Confusion  Goal: Confusion, delirium, dementia, or psychosis is improved or at baseline  Description: INTERVENTIONS:  1. Assess for possible contributors to thought disturbance, including medications, impaired vision or hearing, underlying metabolic abnormalities, dehydration, psychiatric diagnoses, and notify attending LIP  2. [place] high risk fall precautions, as indicated  3. Provide frequent short contacts to provide reality reorientation, refocusing and direction  4. Decrease environmental stimuli, including noise as appropriate  5. Monitor and intervene to maintain adequate nutrition, hydration, elimination, sleep and activity  6. If unable to ensure safety without constant attention obtain sitter and review sitter guidelines with assigned personnel  7. Initiate Psychosocial CNS and Spiritual Care consult, as indicated  11/20/2023 2306 by Matthew Bennett RN  Outcome: Not Progressing     Problem: Safety - Adult  Goal: Free from fall injury  11/20/2023 2306 by Matthew Bennett RN  Outcome: Not Progressing  11/20/2023 1041 by Renan Barahona RN  Outcome: Progressing     Problem: Confusion  Goal: Confusion, delirium, dementia, or psychosis is improved or at baseline  Description: INTERVENTIONS:  1.  Assess for possible contributors to thought disturbance, including medications, impaired vision or

## 2023-11-21 NOTE — CARE COORDINATION
Transition of Care Plan  RUR 16%    Rosina received a call from [facility] , admissions director, at [facility] stating patient cannot be admitted today as the facility has not received authorization from Justin Guerrero talked with Donna Lyman at [address] [place] [address] and she called the facility while ROSINA was on the line. She faxed the authorization to the facility. Patient can be admitted today   Will go to room 105    [facility] with [facility] at 4:30 pm.    ROSINA talked with patient's wife, Emma Boudreaux [phone]  to inform her of the discharge and room number. She will meet patient here with clothing and toiletries.      [address], BSW

## 2023-11-21 NOTE — CARE COORDINATION
Transition of Care Plan to SNF/Rehab    Communication to Patient/Family:  Met with patient and family and they are agreeable to the transition plan. The Plan for Transition of Care is related to the following treatment goals: SNF at [facility]    The Patient and/or patient representative was provided with a choice of provider and agrees  with the discharge plan. Yes [x] No []    A Freedom of choice list was provided with basic dialogue that supports the patient's individualized plan of care/goals and shares the quality data associated with the providers. Yes [x] No []    SNF/Rehab Transition:  Patient has been accepted to [facility] SNF/Rehab and meets criteria for admission. Patient will transported by [facility] and expected to leave at 1630. Communication to SNF/Rehab:  Bedside RN, Dagmar Marcus, has been notified to update the transition plan to the facility and call report [phone], Regarding Room 105. Discharge information has been updated on the AVS. And communicated to facility via [employer], or CC link. Nursing Please include all hard scripts for controlled substances, med rec and dc summary, and AVS in packet. Reviewed and confirmed with facility, [facility], can manage the patient care needs for the following:     Edna Jetty with (X) only those applicable:  Medication:  [x]Medications are available at the facility  []IV Antibiotics    []Controlled Substance - hard copies available sent.   [x]Weekly Labs    Equipment:  []CPAP/BiPAP  []Wound Vacuum  [][place] or Urinary Device  []PICC/Central Line  []Nebulizer  []Ventilator    Treatment:  [x]Isolation (for MRSA, VRE, etc.)  []Surgical Drain Management  []Tracheostomy Care  []Dressing Changes  []Dialysis with transportation  []PEG Care  []Oxygen  []Daily Weights for Heart Failure    Dietary:  []Any diet limitations  []Tube Feedings   []Total Parenteral Management (TPN)    Financial Resources:  []Medicaid Application Completed    []UAI

## 2023-11-21 NOTE — PLAN OF CARE
Problem: Physical Therapy - Adult  Goal: By Discharge: Performs mobility at highest level of function for planned discharge setting. See evaluation for individualized goals. Description: FUNCTIONAL STATUS PRIOR TO ADMISSION: unclear of PLOF; lives with wife and goes to day care center; ambulatory with [facility] per chart    HOME [address] [place] [address] Drive: lives with wife in home    Physical Therapy Goals  Initiated 11/20/2023  1. Patient will move from supine to sit and sit to supine and roll side to side in bed with supervision/set-up within 7 day(s). 2.  Patient will perform sit to stand with supervision/set-up within 7 day(s). 3.  Patient will transfer from bed to chair and chair to bed with supervision/set-up using the least restrictive device within 7 day(s). 4.  Patient will ambulate with contact guard assist for 100 feet with the least restrictive device within 7 day(s). Outcome: Progressing  PHYSICAL THERAPY TREATMENT    Patient: Marylene Screws (58 y.o. male)  Date: 11/21/2023  Diagnosis: Delirium [R41.0]  Failure to thrive in adult [R62.7]  Pneumonia due to COVID-19 virus [U07.1, J12.82]  COVID-19 [U07.1] Pneumonia due to COVID-19 virus      Precautions: Fall Risk, Bed Alarm                    ASSESSMENT:  Patient continues to benefit from skilled PT services and is slowly progressing towards goals. Patient received in bed, confused but cooperative. Able to get to EOB with increased time and near constant encouragement to continue. Assessed with RW and did well with less needed assistance. Noted to have have BM but patient unaware. Stood to be cleaned up. Patient remains limited by significant cognitive impairment. PLAN:  Patient continues to benefit from skilled intervention to address the above impairments. Continue treatment per established plan of care. Recommendation for discharge: (in order for the patient to meet his/her long term goals):  Therapy up to 5 days/week in Skilled

## 2023-11-23 NOTE — PROGRESS NOTES
Physician Progress Note      Sandra Kirkpatrick  CSN #:                  [id number]  :                       1950  ADMIT DATE:       2023 11:04 AM  DISCH DATE:        2023 4:08 PM  RESPONDING  PROVIDER #:        Harleen Nava MD          QUERY TEXT:    Patient admitted with Acute COVID-19 infection. Noted documentation of   Pneumonia due to COVID-19 virus flowing into the History and physical on the   active Problem list.  Please document in the Progress notes and Discharge summary if you are   treating any of the following: The medical record reflects the following:  Risk Factors: Covid 19+    Clinical Indicators:  VS on adm: 97.7, 102, 15, 154/90, O2 Sat 95% on r/a     WBC-6.9, CRP 4.36, D-dimer >35.2, +Covid   CXR- neg     ER- Covid-19     H&P- dry cough and some sob. weak/ lethargic. decreased BS but no   crackles/ wheezes. assess: Acute Covid-19 infection (Covid PNA flowing into   H&P on problem list)     PN: Acute Covid-19 infection-cough/ sob/ lethargy/ fatigue, elev crp but   o2 sats wnl. Treatment:  Decadron IV, Baricitinib, Drop+ isolation, Robitussin    Thank you,  Marge Bianchi RN  Clinical Documentation  [phone], or via [employer]  Options provided:  -- Pneumonia due to COVID-19 virus present as evidenced by, Please document   evidence.   -- Pneumonia due to COVID-19 virus was ruled out, acute bronchitis due to   COVID-19 confirmed  -- Other - I will add my own diagnosis  -- Disagree - Not applicable / Not valid  -- Disagree - Clinically unable to determine / Unknown  -- Refer to Clinical Documentation Reviewer    PROVIDER RESPONSE TEXT:    Pneumonia due to COVID-19 virus is present as evidenced by Reba Quintanilla created by: Evert Woodward on 2023 10:23 AM      Electronically signed by:  Harleen Nava MD 2023 11:01 PM

## 2024-05-30 ENCOUNTER — OFFICE VISIT (OUTPATIENT)
Age: 74
End: 2024-05-30
Payer: MEDICARE

## 2024-05-30 VITALS
HEART RATE: 89 BPM | HEIGHT: 70 IN | OXYGEN SATURATION: 97 % | DIASTOLIC BLOOD PRESSURE: 58 MMHG | SYSTOLIC BLOOD PRESSURE: 146 MMHG | BODY MASS INDEX: 23.82 KG/M2

## 2024-05-30 DIAGNOSIS — I10 ESSENTIAL (PRIMARY) HYPERTENSION: ICD-10-CM

## 2024-05-30 DIAGNOSIS — E78.5 DYSLIPIDEMIA: ICD-10-CM

## 2024-05-30 DIAGNOSIS — I25.10 ATHEROSCLEROSIS OF NATIVE CORONARY ARTERY OF NATIVE HEART WITHOUT ANGINA PECTORIS: Primary | ICD-10-CM

## 2024-05-30 PROCEDURE — 1123F ACP DISCUSS/DSCN MKR DOCD: CPT | Performed by: NURSE PRACTITIONER

## 2024-05-30 PROCEDURE — 3078F DIAST BP <80 MM HG: CPT | Performed by: NURSE PRACTITIONER

## 2024-05-30 PROCEDURE — 99213 OFFICE O/P EST LOW 20 MIN: CPT | Performed by: NURSE PRACTITIONER

## 2024-05-30 PROCEDURE — 3077F SYST BP >= 140 MM HG: CPT | Performed by: NURSE PRACTITIONER

## 2024-05-30 PROCEDURE — 1036F TOBACCO NON-USER: CPT | Performed by: NURSE PRACTITIONER

## 2024-05-30 PROCEDURE — 3017F COLORECTAL CA SCREEN DOC REV: CPT | Performed by: NURSE PRACTITIONER

## 2024-05-30 PROCEDURE — G8420 CALC BMI NORM PARAMETERS: HCPCS | Performed by: NURSE PRACTITIONER

## 2024-05-30 PROCEDURE — G8427 DOCREV CUR MEDS BY ELIG CLIN: HCPCS | Performed by: NURSE PRACTITIONER

## 2024-05-30 RX ORDER — CARVEDILOL 6.25 MG/1
6.25 TABLET ORAL 2 TIMES DAILY
Qty: 180 TABLET | Refills: 1 | Status: SHIPPED | OUTPATIENT
Start: 2024-05-30

## 2024-05-30 ASSESSMENT — PATIENT HEALTH QUESTIONNAIRE - PHQ9
2. FEELING DOWN, DEPRESSED OR HOPELESS: NOT AT ALL
SUM OF ALL RESPONSES TO PHQ9 QUESTIONS 1 & 2: 0
SUM OF ALL RESPONSES TO PHQ QUESTIONS 1-9: 0
1. LITTLE INTEREST OR PLEASURE IN DOING THINGS: NOT AT ALL
SUM OF ALL RESPONSES TO PHQ QUESTIONS 1-9: 0

## 2024-05-30 NOTE — PROGRESS NOTES
[facility]              OFFICE NOTE       BROOKE ARRIETA   1950  [id number]    Date/Time:  5/30/20242:28 PM    Cardiologist: JENNY MCMILLAN M.D.,MASOUD.A.C.C.    SUBJECTIVE:    Seemingly asymptomatic   Has significant memory impairment   Denies any chest pain or palpitations  No shortness of breath  Has aide with him, now in memory care, PCP updated in Epic    Assessment/Plan  1.  CAD: Status post MI in 2018 status post PCI of the circumflex in 2018 in [facility].  At the time the cardiac catheterization revealed an ejection fraction of 50%, 20% left main stenosis, 70% distal LAD and 50% mid RCA stenosis and 40% of PDA.  Once again the circumflex completely occluded at that time and he underwent a PCI with a 3.0 x 16 mm Synergy stent.  Continue aspirin, Plavix, Lipitor and Coreg.  Dr. Mcmillan discussed results of nuclear stress test in 2023   ECG without ischemic changes in 11/23  Follow up with Dr. Mcmillan in 6 months    2.  PAD: Patient with distal thoracic aortic and abdominal aneurysm status post stenting in 2018.  + abd us 1/ 2023 with no evidence of abdominal aortic dilatation maximum diameter was 2.4 cm.  No visualized stents     3.  Memory impairment: Significant.     4.  Hypertension: BP elevated today, advised him to increase coreg to 6.25mg BID ,continue hydralazine, will follow up with IM at facility     5.  Hyperlipidemia: Continue Lipitor.  Followed by IM at facility.  LDL goal less than 70.       HPI     Patient is a 74-year-old male  relocated from [facility] with a past medical history remarkable for hypertension, hyperlipidemia, coronary disease, aortic disease, and memory impairment presents today for routine follow-up.     Status post MI in 2018 status post PCI of the circumflex in 2018 in [facility].  At the time the cardiac catheterization revealed an ejection fraction of 50%, 20% left main stenosis 70% distal LAD and 50% mid RCA stenosis

## 2024-12-07 NOTE — TELEPHONE ENCOUNTER
Cardiologist: Dr. Tamela Hunt    Last appt: 12/29/2021  Future Appointments   Date Time Provider Denny Murphy   3/3/2022 11:00 AM MD JAYASHREE Rivers BS AMB   6/22/2022 11:30 AM Kaya Cabezas MD [facility] AMB       Requested Prescriptions     Signed Prescriptions Disp Refills    carvediloL (COREG) 6.25 mg tablet 180 Tablet 1     Sig: Take 1 Tablet by mouth two (2) times a day. Authorizing Provider: Perrin Closs     Ordering User: PEPE ROUSE         Refills VO per Dr. Skyler Norman. No

## 2025-01-16 ENCOUNTER — TELEPHONE (OUTPATIENT)
Age: 75
End: 2025-01-16

## 2025-01-16 NOTE — TELEPHONE ENCOUNTER
Patient spouse returned your call when you get a opportunity her contact is [phone], offered her 1/21/25 & 2/28/25, she prefers another date

## 2025-01-16 NOTE — TELEPHONE ENCOUNTER
Future Appointments   Date Time Provider Department Center   2/25/2025  9:00 AM Suzi Solomon APRN - NP [facility] AMB

## 2025-06-12 NOTE — TELEPHONE ENCOUNTER
Per VO by MD.    Future Appointments   Date Time Provider [facility]   10/12/2022 11:00 AM True Rome MD [facility] AMB   11/2/2022  2:00 PM Charito Guajardo DO [facility] BS AMB Attending Only